# Patient Record
Sex: MALE | Race: WHITE
[De-identification: names, ages, dates, MRNs, and addresses within clinical notes are randomized per-mention and may not be internally consistent; named-entity substitution may affect disease eponyms.]

---

## 2019-06-14 ENCOUNTER — HOSPITAL ENCOUNTER (INPATIENT)
Dept: HOSPITAL 47 - EC | Age: 79
LOS: 4 days | Discharge: HOME | DRG: 280 | End: 2019-06-18
Payer: MEDICARE

## 2019-06-14 VITALS — BODY MASS INDEX: 34.4 KG/M2

## 2019-06-14 DIAGNOSIS — I25.10: ICD-10-CM

## 2019-06-14 DIAGNOSIS — I65.29: ICD-10-CM

## 2019-06-14 DIAGNOSIS — Z95.5: ICD-10-CM

## 2019-06-14 DIAGNOSIS — Z82.49: ICD-10-CM

## 2019-06-14 DIAGNOSIS — R79.1: ICD-10-CM

## 2019-06-14 DIAGNOSIS — E66.9: ICD-10-CM

## 2019-06-14 DIAGNOSIS — I08.1: ICD-10-CM

## 2019-06-14 DIAGNOSIS — Z66: ICD-10-CM

## 2019-06-14 DIAGNOSIS — Z92.21: ICD-10-CM

## 2019-06-14 DIAGNOSIS — Z82.3: ICD-10-CM

## 2019-06-14 DIAGNOSIS — Z85.51: ICD-10-CM

## 2019-06-14 DIAGNOSIS — Z87.891: ICD-10-CM

## 2019-06-14 DIAGNOSIS — E86.0: ICD-10-CM

## 2019-06-14 DIAGNOSIS — J44.0: ICD-10-CM

## 2019-06-14 DIAGNOSIS — I73.9: ICD-10-CM

## 2019-06-14 DIAGNOSIS — Z79.82: ICD-10-CM

## 2019-06-14 DIAGNOSIS — N17.9: ICD-10-CM

## 2019-06-14 DIAGNOSIS — Z79.02: ICD-10-CM

## 2019-06-14 DIAGNOSIS — Z79.899: ICD-10-CM

## 2019-06-14 DIAGNOSIS — J18.9: ICD-10-CM

## 2019-06-14 DIAGNOSIS — Z95.810: ICD-10-CM

## 2019-06-14 DIAGNOSIS — E78.5: ICD-10-CM

## 2019-06-14 DIAGNOSIS — I11.0: ICD-10-CM

## 2019-06-14 DIAGNOSIS — I50.23: ICD-10-CM

## 2019-06-14 DIAGNOSIS — Z95.0: ICD-10-CM

## 2019-06-14 DIAGNOSIS — Z95.1: ICD-10-CM

## 2019-06-14 DIAGNOSIS — I25.5: ICD-10-CM

## 2019-06-14 DIAGNOSIS — I21.4: Primary | ICD-10-CM

## 2019-06-14 LAB
ALBUMIN SERPL-MCNC: 4 G/DL (ref 3.5–5)
ALP SERPL-CCNC: 140 U/L (ref 38–126)
ALT SERPL-CCNC: 61 U/L (ref 21–72)
ANION GAP SERPL CALC-SCNC: 11 MMOL/L
APTT BLD: 25.7 SEC (ref 22–30)
AST SERPL-CCNC: 60 U/L (ref 17–59)
BASOPHILS # BLD AUTO: 0 K/UL (ref 0–0.2)
BASOPHILS NFR BLD AUTO: 1 %
BUN SERPL-SCNC: 40 MG/DL (ref 9–20)
CALCIUM SPEC-MCNC: 9.7 MG/DL (ref 8.4–10.2)
CHLORIDE SERPL-SCNC: 103 MMOL/L (ref 98–107)
CO2 SERPL-SCNC: 28 MMOL/L (ref 22–30)
D DIMER PPP FEU-MCNC: 4.84 MG/L FEU (ref ?–0.6)
EOSINOPHIL # BLD AUTO: 0.1 K/UL (ref 0–0.7)
EOSINOPHIL NFR BLD AUTO: 3 %
ERYTHROCYTE [DISTWIDTH] IN BLOOD BY AUTOMATED COUNT: 4.62 M/UL (ref 4.3–5.9)
ERYTHROCYTE [DISTWIDTH] IN BLOOD: 18 % (ref 11.5–15.5)
GLUCOSE SERPL-MCNC: 96 MG/DL (ref 74–99)
HCT VFR BLD AUTO: 48.3 % (ref 39–53)
HGB BLD-MCNC: 15.4 GM/DL (ref 13–17.5)
INR PPP: 1.7 (ref ?–1.2)
LYMPHOCYTES # SPEC AUTO: 0.9 K/UL (ref 1–4.8)
LYMPHOCYTES NFR SPEC AUTO: 18 %
MAGNESIUM SPEC-SCNC: 1.9 MG/DL (ref 1.6–2.3)
MCH RBC QN AUTO: 33.3 PG (ref 25–35)
MCHC RBC AUTO-ENTMCNC: 31.9 G/DL (ref 31–37)
MCV RBC AUTO: 104.5 FL (ref 80–100)
MONOCYTES # BLD AUTO: 0.4 K/UL (ref 0–1)
MONOCYTES NFR BLD AUTO: 7 %
NEUTROPHILS # BLD AUTO: 3.5 K/UL (ref 1.3–7.7)
NEUTROPHILS NFR BLD AUTO: 69 %
PLATELET # BLD AUTO: 112 K/UL (ref 150–450)
POTASSIUM SERPL-SCNC: 4 MMOL/L (ref 3.5–5.1)
PROT SERPL-MCNC: 7.2 G/DL (ref 6.3–8.2)
PT BLD: 16.6 SEC (ref 9–12)
SODIUM SERPL-SCNC: 142 MMOL/L (ref 137–145)
WBC # BLD AUTO: 5.1 K/UL (ref 3.8–10.6)

## 2019-06-14 PROCEDURE — 82272 OCCULT BLD FECES 1-3 TESTS: CPT

## 2019-06-14 PROCEDURE — 85025 COMPLETE CBC W/AUTO DIFF WBC: CPT

## 2019-06-14 PROCEDURE — 96365 THER/PROPH/DIAG IV INF INIT: CPT

## 2019-06-14 PROCEDURE — 96376 TX/PRO/DX INJ SAME DRUG ADON: CPT

## 2019-06-14 PROCEDURE — 71046 X-RAY EXAM CHEST 2 VIEWS: CPT

## 2019-06-14 PROCEDURE — 85379 FIBRIN DEGRADATION QUANT: CPT

## 2019-06-14 PROCEDURE — 93005 ELECTROCARDIOGRAM TRACING: CPT

## 2019-06-14 PROCEDURE — 80048 BASIC METABOLIC PNL TOTAL CA: CPT

## 2019-06-14 PROCEDURE — 36415 COLL VENOUS BLD VENIPUNCTURE: CPT

## 2019-06-14 PROCEDURE — 99291 CRITICAL CARE FIRST HOUR: CPT

## 2019-06-14 PROCEDURE — 78582 LUNG VENTILAT&PERFUS IMAGING: CPT

## 2019-06-14 PROCEDURE — 83880 ASSAY OF NATRIURETIC PEPTIDE: CPT

## 2019-06-14 PROCEDURE — 94640 AIRWAY INHALATION TREATMENT: CPT

## 2019-06-14 PROCEDURE — 85610 PROTHROMBIN TIME: CPT

## 2019-06-14 PROCEDURE — 85730 THROMBOPLASTIN TIME PARTIAL: CPT

## 2019-06-14 PROCEDURE — 80053 COMPREHEN METABOLIC PANEL: CPT

## 2019-06-14 PROCEDURE — 83735 ASSAY OF MAGNESIUM: CPT

## 2019-06-14 PROCEDURE — 84484 ASSAY OF TROPONIN QUANT: CPT

## 2019-06-14 PROCEDURE — 96366 THER/PROPH/DIAG IV INF ADDON: CPT

## 2019-06-14 PROCEDURE — 93306 TTE W/DOPPLER COMPLETE: CPT

## 2019-06-14 RX ADMIN — CARVEDILOL SCH MG: 3.12 TABLET, FILM COATED ORAL at 18:08

## 2019-06-14 RX ADMIN — FUROSEMIDE SCH: 10 INJECTION, SOLUTION INTRAMUSCULAR; INTRAVENOUS at 15:54

## 2019-06-14 RX ADMIN — CEFAZOLIN SCH MLS/HR: 330 INJECTION, POWDER, FOR SOLUTION INTRAMUSCULAR; INTRAVENOUS at 14:22

## 2019-06-14 RX ADMIN — ATORVASTATIN CALCIUM SCH MG: 40 TABLET, FILM COATED ORAL at 18:08

## 2019-06-14 RX ADMIN — HEPARIN SODIUM SCH MLS/HR: 10000 INJECTION, SOLUTION INTRAVENOUS at 14:33

## 2019-06-14 RX ADMIN — OXYCODONE AND ACETAMINOPHEN PRN EACH: 5; 325 TABLET ORAL at 18:08

## 2019-06-14 NOTE — XR
EXAMINATION TYPE: XR chest 2V

 

DATE OF EXAM: 6/14/2019

 

COMPARISON: NONE

 

HISTORY: Shortness of breath for 3 weeks

 

TECHNIQUE:  Frontal and lateral views of the chest are obtained.

 

FINDINGS:  There is a multilead left-sided cardiac device and enlarged cardiac mediastinal silhouette
. Post CABG changes are also noted. There is haziness surrounding the right heart border. No signific
ant pulmonary vascular congestion. No sizable pleural effusion or pneumothorax. Postsurgical change o
f the thoracolumbar spine is seen with mild compression deformity at the level above the postsurgical
 change and diffuse osseous demineralization as well as moderate degenerative changes of the spine.

 

IMPRESSION:  

1. Hazy right middle lobe opacity overlying the right cardiac border could relate to atelectasis or p
neumonia in the proper clinical setting.

2. Enlarged cardiomediastinal silhouette is seen with postoperative change.

3. Age indeterminant compression deformity of the thoracic spine a level above the surgical site. Cor
relate for point tenderness. No priors for comparison.

## 2019-06-14 NOTE — ED
SOB HPI





- General


Chief Complaint: Shortness of Breath


Stated Complaint: emma


Time Seen by Provider: 06/14/19 11:57


Source: patient, family, RN notes reviewed, old records reviewed


Mode of arrival: wheelchair


Limitations: no limitations





- History of Present Illness


Initial Comments: 





This is a 71-year-old male who presents with complaints of shortness of breath 

and going on progressively over last he did recently drive from [place] to 

[place] he does say he's been having worsening shortness of breath some 

orthopnea and exertional dyspnea.  No fevers chills nausea vomiting sweats no 

overt chest pain.  He has a history of heart failure.  He does have some 

peripheral edema which she states is not as bad as it has been in the past.  He 

has a cough some clear phlegm and per his wife he's been less active for last 

week or 2 then usual.


MD Complaint: shortness of breath





- Related Data


                                Home Medications











 Medication  Instructions  Recorded  Confirmed


 


Allopurinol [Zyloprim] 300 mg PO DAILY 06/14/19 06/14/19


 


Aspirin [[place] Aspirin EC] 81 mg PO DAILY 06/14/19 06/14/19


 


Atorvastatin [Lipitor] 40 mg PO DAILY 06/14/19 06/14/19


 


Bumetanide [BUMEX] 1 mg PO DAILY 06/14/19 06/14/19


 


Carvedilol [Coreg] 3.125 mg PO BID 06/14/19 06/14/19


 


Clopidogrel [Plavix] 75 mg PO DAILY 06/14/19 06/14/19


 


Lisinopril [Zestril] 10 mg PO DAILY 06/14/19 06/14/19


 


oxyCODONE HCL/ACETAMINOPHEN 1 tab PO Q4HR PRN 06/14/19 06/14/19





[Percocet 5-325 mg]   











                                    Allergies











Allergy/AdvReac Type Severity Reaction Status Date / Time


 


No Known Allergies Allergy   Verified 06/14/19 12:07














Review of Systems


ROS Statement: 


Those systems with pertinent positive or pertinent negative responses have been 

documented in the HPI.





ROS Other: All systems not noted in ROS Statement are negative.





Past Medical History


Past Medical History: Coronary Artery Disease (CAD), Cancer, Vascular Disorder


Additional Past Medical History / Comment(s): bladder cancer, carotid stenosis


History of Any Multi-Drug Resistant Organisms: None Reported


Past Surgical History: Appendectomy, Back Surgery, Coronary Bypass/CABG, Heart 

Catheterization, Heart Catheterization With Stent, Pacemaker


Past Psychological History: No Psychological Hx Reported


Smoking Status: Former smoker


Past Alcohol Use History: None Reported


Past Drug Use History: None Reported





General Exam





- General Exam Comments


Initial Comments: 





Is a well-developed well-nourished awake alert oriented 3


Limitations: no limitations


General appearance: alert, in no apparent distress


Head exam: Present: atraumatic, normocephalic, normal inspection


Eye exam: Present: normal appearance, PERRL, EOMI.  Absent: scleral icterus, 

conjunctival injection, periorbital swelling


ENT exam: Present: normal exam, mucous membranes moist


Neck exam: Present: normal inspection, full ROM, other (No stridor JVD or 

bruits).  Absent: tenderness, meningismus, lymphadenopathy


Respiratory exam: Present: normal lung sounds bilaterally.  Absent: respiratory 

distress, wheezes, rales, rhonchi, stridor


Cardiovascular Exam: Present: regular rate, normal rhythm, normal heart sounds. 

Absent: systolic murmur, diastolic murmur, rubs, gallop, clicks


GI/Abdominal exam: Present: soft, normal bowel sounds.  Absent: distended, 

tenderness, guarding, rebound, rigid


Rectal exam: Present: deferred


Extremities exam: Present: full ROM, normal capillary refill, pedal edema.  

Absent: tenderness, joint swelling, calf tenderness


Back exam: Present: normal inspection


Neurological exam: Present: alert, oriented X3, CN II-XII intact


Psychiatric exam: Present: normal affect, normal mood


Skin exam: Present: warm, dry, intact, normal color.  Absent: rash





Course


                                   Vital Signs











  06/14/19 06/14/19 06/14/19





  11:49 12:18 12:28


 


Temperature 97.7 F  


 


Pulse Rate 115 H 114 H 114 H


 


Respiratory 24  





Rate   


 


Blood Pressure 111/71  


 


O2 Sat by Pulse 100  





Oximetry   














  06/14/19





  13:17


 


Temperature 


 


Pulse Rate 114 H


 


Respiratory 18





Rate 


 


Blood Pressure 110/83


 


O2 Sat by Pulse 100





Oximetry 














- Reevaluation(s)


Reevaluation #1: 





06/14/19 13:47


Patient did get some relief and is breathing after the nebulizer treatment.





Medical Decision Making





- Medical Decision Making





I did discuss findings with the patient and family member.  Patient will be 

admitted he does demonstrate evidence of dehydration intravascularly though he 

does also have evidence of congestive heart failure with markedly elevated d-

dimer elevated troponin and renal insufficiency and elevated creatinine.  

Patient will be admitted VQ scan will be ordered.





- Lab Data


Result diagrams: 


                                 06/14/19 12:18





                                 06/14/19 12:18


                                   Lab Results











  06/14/19 06/14/19 06/14/19 Range/Units





  12:18 12:18 12:18 


 


WBC  5.1    (3.8-10.6)  k/uL


 


RBC  4.62    (4.30-5.90)  m/uL


 


Hgb  15.4    (13.0-17.5)  gm/dL


 


Hct  48.3    (39.0-53.0)  %


 


MCV  104.5 H    (80.0-100.0)  fL


 


MCH  33.3    (25.0-35.0)  pg


 


MCHC  31.9    (31.0-37.0)  g/dL


 


RDW  18.0 H    (11.5-15.5)  %


 


Plt Count  112 L    (150-450)  k/uL


 


Neutrophils %  69    %


 


Lymphocytes %  18    %


 


Monocytes %  7    %


 


Eosinophils %  3    %


 


Basophils %  1    %


 


Neutrophils #  3.5    (1.3-7.7)  k/uL


 


Lymphocytes #  0.9 L    (1.0-4.8)  k/uL


 


Monocytes #  0.4    (0-1.0)  k/uL


 


Eosinophils #  0.1    (0-0.7)  k/uL


 


Basophils #  0.0    (0-0.2)  k/uL


 


Hypochromasia  Slight    


 


Anisocytosis  Slight    


 


Macrocytosis  Moderate    


 


PT    16.6 H  (9.0-12.0)  sec


 


INR    1.7 H  (<1.2)  


 


APTT    25.7  (22.0-30.0)  sec


 


D-Dimer    4.84 H  (<0.60)  mg/L FEU


 


Sodium   142   (137-145)  mmol/L


 


Potassium   4.0   (3.5-5.1)  mmol/L


 


Chloride   103   ()  mmol/L


 


Carbon Dioxide   28   (22-30)  mmol/L


 


Anion Gap   11   mmol/L


 


BUN   40 H   (9-20)  mg/dL


 


Creatinine   1.63 H   (0.66-1.25)  mg/dL


 


Est GFR (CKD-EPI)AfAm   46   (>60 ml/min/1.73 sqM)  


 


Est GFR (CKD-EPI)NonAf   40   (>60 ml/min/1.73 sqM)  


 


Glucose   96   (74-99)  mg/dL


 


Calcium   9.7   (8.4-10.2)  mg/dL


 


Magnesium   1.9   (1.6-2.3)  mg/dL


 


Total Bilirubin   3.8 H   (0.2-1.3)  mg/dL


 


AST   60 H   (17-59)  U/L


 


ALT   61   (21-72)  U/L


 


Alkaline Phosphatase   140 H   ()  U/L


 


Troponin I     (0.000-0.034)  ng/mL


 


NT-Pro-B Natriuret Pep     pg/mL


 


Total Protein   7.2   (6.3-8.2)  g/dL


 


Albumin   4.0   (3.5-5.0)  g/dL














  06/14/19 06/14/19 Range/Units





  12:18 12:18 


 


WBC    (3.8-10.6)  k/uL


 


RBC    (4.30-5.90)  m/uL


 


Hgb    (13.0-17.5)  gm/dL


 


Hct    (39.0-53.0)  %


 


MCV    (80.0-100.0)  fL


 


MCH    (25.0-35.0)  pg


 


MCHC    (31.0-37.0)  g/dL


 


RDW    (11.5-15.5)  %


 


Plt Count    (150-450)  k/uL


 


Neutrophils %    %


 


Lymphocytes %    %


 


Monocytes %    %


 


Eosinophils %    %


 


Basophils %    %


 


Neutrophils #    (1.3-7.7)  k/uL


 


Lymphocytes #    (1.0-4.8)  k/uL


 


Monocytes #    (0-1.0)  k/uL


 


Eosinophils #    (0-0.7)  k/uL


 


Basophils #    (0-0.2)  k/uL


 


Hypochromasia    


 


Anisocytosis    


 


Macrocytosis    


 


PT    (9.0-12.0)  sec


 


INR    (<1.2)  


 


APTT    (22.0-30.0)  sec


 


D-Dimer    (<0.60)  mg/L FEU


 


Sodium    (137-145)  mmol/L


 


Potassium    (3.5-5.1)  mmol/L


 


Chloride    ()  mmol/L


 


Carbon Dioxide    (22-30)  mmol/L


 


Anion Gap    mmol/L


 


BUN    (9-20)  mg/dL


 


Creatinine    (0.66-1.25)  mg/dL


 


Est GFR (CKD-EPI)AfAm    (>60 ml/min/1.73 sqM)  


 


Est GFR (CKD-EPI)NonAf    (>60 ml/min/1.73 sqM)  


 


Glucose    (74-99)  mg/dL


 


Calcium    (8.4-10.2)  mg/dL


 


Magnesium    (1.6-2.3)  mg/dL


 


Total Bilirubin    (0.2-1.3)  mg/dL


 


AST    (17-59)  U/L


 


ALT    (21-72)  U/L


 


Alkaline Phosphatase    ()  U/L


 


Troponin I   0.163 H*  (0.000-0.034)  ng/mL


 


NT-Pro-B Natriuret Pep  3410   pg/mL


 


Total Protein    (6.3-8.2)  g/dL


 


Albumin    (3.5-5.0)  g/dL














- EKG Data


-: EKG Interpreted by Me (Atrial sensed ventricular paced rhythm with PVC rate 

was 1:151:30 QRS d)





- Radiology Data


Radiology results: report reviewed (I did review the imaging and report or is 

some evidence of increased pulmonary markings basically report), image reviewed





Critical Care Time


Critical Care Time: Yes


Critical Care Time: 





31 minutes of critical care time which includes initial presentation with 

history physical labs x-rays multiple reevaluation the patient discussed with 

patient family regarding findings discussed with Dr. honeycutt regarding the 

findings admission orders and documentation of the above





Disposition


Clinical Impression: 


 Systolic congestive heart failure, Acute kidney injury, Elevated d-dimer, 

Elevated troponin, Acute bronchospasm





Disposition: ADMITTED AS IP TO THIS [facility]


Condition: Fair


Referrals: 


Nonstaff,Physician [Primary Care Provider] - 1-2 days

## 2019-06-14 NOTE — NM
EXAMINATION TYPE: NM pul vent and perfuse

 

DATE OF EXAM: 6/14/2019

 

COMPARISON: Chest x-ray same date

 

HISTORY: Shortness of breath, elevated d-dimer

 

TECHNIQUE:  Utilizing inhalation of 65.7 mCi Tc 99m DTPA aerosol and intravenous injection of 5.12 mC
i of Tc 99m MAA, ventilation and perfusion images are acquired post injection in multiple projections
. 

 

FINDINGS:  There is no evidence of mismatched defects.  Central clumping of the radiopharmaceutical o
n ventilation imaging compatible with underlying COPD. Overall uptake is better on perfusion imaging 
than on ventilation imaging.

 

IMPRESSION: 

Low probability for pulmonary embolism.

## 2019-06-15 LAB
BASOPHILS # BLD AUTO: 0.1 K/UL (ref 0–0.2)
BASOPHILS NFR BLD AUTO: 1 %
EOSINOPHIL # BLD AUTO: 0.2 K/UL (ref 0–0.7)
EOSINOPHIL NFR BLD AUTO: 4 %
ERYTHROCYTE [DISTWIDTH] IN BLOOD BY AUTOMATED COUNT: 4.28 M/UL (ref 4.3–5.9)
ERYTHROCYTE [DISTWIDTH] IN BLOOD: 17.8 % (ref 11.5–15.5)
HCT VFR BLD AUTO: 44.7 % (ref 39–53)
HGB BLD-MCNC: 14.1 GM/DL (ref 13–17.5)
LYMPHOCYTES # SPEC AUTO: 1 K/UL (ref 1–4.8)
LYMPHOCYTES NFR SPEC AUTO: 18 %
MCH RBC QN AUTO: 33 PG (ref 25–35)
MCHC RBC AUTO-ENTMCNC: 31.6 G/DL (ref 31–37)
MCV RBC AUTO: 104.4 FL (ref 80–100)
MONOCYTES # BLD AUTO: 0.4 K/UL (ref 0–1)
MONOCYTES NFR BLD AUTO: 6 %
NEUTROPHILS # BLD AUTO: 3.9 K/UL (ref 1.3–7.7)
NEUTROPHILS NFR BLD AUTO: 68 %
PLATELET # BLD AUTO: 122 K/UL (ref 150–450)
WBC # BLD AUTO: 5.7 K/UL (ref 3.8–10.6)

## 2019-06-15 RX ADMIN — BUMETANIDE SCH MG: 1 TABLET ORAL at 18:41

## 2019-06-15 RX ADMIN — ATORVASTATIN CALCIUM SCH MG: 40 TABLET, FILM COATED ORAL at 09:08

## 2019-06-15 RX ADMIN — ALLOPURINOL SCH MG: 300 TABLET ORAL at 09:08

## 2019-06-15 RX ADMIN — BUMETANIDE SCH: 1 TABLET ORAL at 16:53

## 2019-06-15 RX ADMIN — CARVEDILOL SCH MG: 3.12 TABLET, FILM COATED ORAL at 17:21

## 2019-06-15 RX ADMIN — CLOPIDOGREL BISULFATE SCH MG: 75 TABLET ORAL at 09:08

## 2019-06-15 RX ADMIN — FUROSEMIDE SCH MG: 10 INJECTION, SOLUTION INTRAMUSCULAR; INTRAVENOUS at 02:17

## 2019-06-15 RX ADMIN — CEFAZOLIN SCH MLS/HR: 330 INJECTION, POWDER, FOR SOLUTION INTRAMUSCULAR; INTRAVENOUS at 13:58

## 2019-06-15 RX ADMIN — ASPIRIN 81 MG CHEWABLE TABLET SCH MG: 81 TABLET CHEWABLE at 09:08

## 2019-06-15 RX ADMIN — CARVEDILOL SCH MG: 3.12 TABLET, FILM COATED ORAL at 06:49

## 2019-06-15 RX ADMIN — ATORVASTATIN CALCIUM SCH MG: 40 TABLET, FILM COATED ORAL at 21:24

## 2019-06-15 RX ADMIN — HEPARIN SODIUM SCH: 10000 INJECTION, SOLUTION INTRAVENOUS at 03:27

## 2019-06-15 RX ADMIN — AZITHROMYCIN SCH MG: 250 TABLET, FILM COATED ORAL at 13:58

## 2019-06-15 NOTE — P.CNPUL
History of Present Illness


Consult date: 06/15/19


Reason for consult: dyspnea


History of present illness: 





71-year-old male patient with known history of coronary artery disease, previous

bypass surgery, possible cardiomyopathy as the patient has a AICD with 

biventricular pacer and all of this work has been done in [place].  The patient 

also has history of bladder cancer receiving intravesicular chemotherapy 

locally.  No history of any chronic lung disease or disorder.  The patient 

arrived to [place] approximately 5 weeks ago.  This is his summer vacation.  

During the study was having increased cough and congestion and some increased 

shortness of breath.  The patient came into the hospital.  He was found to be in

acute kidney injury with a creatinine of 1.6.  His troponins were minimally 

elevated and there was evidence of troponin leak consistent with an acute non-

STEMI.  The patient is a chest x-ray that showed some limited right middle lobe 

pulmonary infiltrates.  The pulmonary consultation was requested.  The patient 

is also is being seen by cardiology.  He is on room air for now.  No nausea.  No

vomiting.  No aspiration.  He does not utilize any form of oxygen or pulmonary 

medication on outpatient basis.  Baseline creatinine is not known.  Otherwise no

other records available.  He is known to have peripheral vascular disease with 

previous intervention to his lower extremities.  He is also has undergone 

coronary stenting and previous bypass surgery.





Review of Systems








Constitutional: Denies chills, Denies fever


Eyes: denies blurred vision


Ears, nose, mouth and throat: Denies epistaxis, Denies hoarseness


Cardiovascular: Reports dyspnea on exertion, Reports edema


Respiratory: Reports cough with sputum


Gastrointestinal: Denies abdominal pain, Denies nausea, Denies vomiting


Genitourinary: Denies dysuria, Denies hematuria


Integumentary: Denies color changes, Denies rash


Neurological: Denies confusion, Denies double vision, Denies gait dysfunction


Psychiatric: Denies anxiety, Denies confusion


Endocrine: Denies cold intolerance, Denies heat intolerance


Hematologic/Lymphatic: Denies easy bruising, Denies lymphadenopathy


Allergic/Immunologic: Denies anaphylaxis, Denies seasonal allergies








Past Medical History


Past Medical History: Coronary Artery Disease (CAD), Cancer, Vascular Disorder


Additional Past Medical History / Comment(s): bladder cancer treated with 

intravesicular chemotherapy, peripheral vascular disease, carotid artery 

stenosis, coronary artery disease with previous bypass surgery, hyperuricemia, 

hyperlipidemia, previous history of acid the/ventricular pacer insertion, 

suspected cardiomyopathy.


History of Any Multi-Drug Resistant Organisms: None Reported


Past Surgical History: Appendectomy, Back Surgery, Coronary Bypass/CABG, Heart 

Catheterization, Heart Catheterization With Stent, Pacemaker


Past Anesthesia/Blood Transfusion Reactions: No Reported Reaction


Date of Last Stent Placement:: 2016


Type of Cardiac Device: AICD


Device Placement Date:: 2016


Past Psychological History: No Psychological Hx Reported


Smoking Status: Former smoker


Past Alcohol Use History: None Reported


Past Drug Use History: None Reported





- Past Family History


  ** Father


Family Medical History: CVA/TIA





  ** Mother


Family Medical History: Myocardial Infarction (MI)





Medications and Allergies


                                Home Medications











 Medication  Instructions  Recorded  Confirmed  Type


 


Allopurinol [Zyloprim] 300 mg PO DAILY 06/14/19 06/14/19 History


 


Aspirin [[place] Aspirin EC] 81 mg PO DAILY 06/14/19 06/14/19 History


 


Atorvastatin [Lipitor] 40 mg PO DAILY 06/14/19 06/14/19 History


 


Bumetanide [BUMEX] 1 mg PO DAILY 06/14/19 06/14/19 History


 


Carvedilol [Coreg] 3.125 mg PO BID 06/14/19 06/14/19 History


 


Clopidogrel [Plavix] 75 mg PO DAILY 06/14/19 06/14/19 History


 


Lisinopril [Zestril] 10 mg PO DAILY 06/14/19 06/14/19 History


 


oxyCODONE HCL/ACETAMINOPHEN 1 tab PO Q4HR PRN 06/14/19 06/14/19 History





[Percocet 5-325 mg]    








                                    Allergies











Allergy/AdvReac Type Severity Reaction Status Date / Time


 


No Known Allergies Allergy   Verified 06/14/19 12:07














Physical Exam


Vitals: 


                                   Vital Signs











  Temp Pulse Pulse Resp BP BP BP


 


 06/15/19 11:34  97.5 F L   109 H  20   


 


 06/15/19 10:34    111 H     107/62


 


 06/15/19 08:00  97.3 F L   110 H  16   


 


 06/15/19 04:00  97.3 F L   110 H  18   86/62 


 


 06/15/19 00:00  97.4 F L   110 H  18   91/56 


 


 06/14/19 20:00  97.6 F   112 H  16   89/62 


 


 06/14/19 17:26  97.5 F L   78  22   101/71 


 


 06/14/19 16:57      90/67  


 


 06/14/19 16:33   112 H   20  90/66  


 


 06/14/19 15:43   111 H   20  90/55  


 


 06/14/19 15:32   99   20  103/64  


 


 06/14/19 14:40   112 H   18  101/68  


 


 06/14/19 14:14   112 H   18  96/69  














  BP Pulse Ox


 


 06/15/19 11:34  82/58  94 L


 


 06/15/19 10:34  


 


 06/15/19 08:00  82/58  94 L


 


 06/15/19 04:00   94 L


 


 06/15/19 00:00   95


 


 06/14/19 20:00   94 L


 


 06/14/19 17:26  


 


 06/14/19 16:57  


 


 06/14/19 16:33   98


 


 06/14/19 15:43   92 L


 


 06/14/19 15:32   95


 


 06/14/19 14:40   99


 


 06/14/19 14:14   100








                                Intake and Output











 06/14/19 06/15/19 06/15/19





 22:59 06:59 14:59


 


Intake Total 246.445 0 420


 


Output Total 150 125 


 


Balance 96.445 -125 420


 


Intake:   


 


  Intake, IV Titration 126.445 0 





  Amount   


 


    Heparin Sod,Pork in 0.45% 126.445 0 





    NaCl 25,000 unit In 0.45   





    % NaCl 1 250ml.bag @ 18   





    UNITS/KG/HR 18.779 mls/hr   





    IV .[id number] [facility] Rx#:   





    [id number]   


 


  Oral 120  420


 


Output:   


 


  Urine 150 125 


 


Other:   


 


  Voiding Method Urinal Urinal Urinal


 


  # Voids 1  


 


  # Bowel Movements   1


 


  Weight 109.9 kg 109 kg 














Results








Gen. appearance, comfortable likely distress


Head exam was generally normal. There was no scleral icterus or corneal arcus. 

Mucous membranes were moist.


Neck was supple and without jugular venous distension, thyromegaly, or carotid 

bruits. Carotids were easily palpable bilaterally. There was no adenopathy.


Lungs sounds are clear diminished in lung bases otherwise there is no crackles 

or rhonchi or wheezes.


Cardiac exam revealed the PMI to be normally situated and sized. The rhythm was 

regular and no extrasystoles were noted during several minutes of auscultation. 

The first and second heart sounds were normal and physiologic splitting of the s

econd heart sound was noted. There were no murmurs, rubs, clicks, or gallops.  

The patient is a pacemaker pocket in the left anterior chest area in addition to

sternotomy scar which is dry clean and intact.


Abdominal exam revealed normal bowel sounds. The abdomen was soft, non-tender, 

and without masses, organomegaly, or appreciable enlargement of the abdominal 

aorta.


Examination of the extremities revealed easily palpable yet diminished radial, 

femoral and pedal pulses. There was no cyanosis, clubbing or edema.


Examination of the skin revealed no evidence of significant rashes, suspicious 

appearing nevi or other concerning lesions.





- Laboratory Findings


CBC and BMP: 


                                 06/15/19 06:21





                                 06/14/19 12:18


PT/INR, D-dimer











PT  16.6 sec (9.0-12.0)  H  06/14/19  12:18    


 


INR  1.7  (<1.2)  H  06/14/19  12:18    


 


D-Dimer  4.84 mg/L FEU (<0.60)  H  06/14/19  12:18    








Abnormal lab findings: 


                                  Abnormal Labs











  06/14/19 06/14/19 06/14/19





  12:18 12:18 12:18


 


RBC   


 


MCV  104.5 H  


 


RDW  18.0 H  


 


Plt Count  112 L  


 


Lymphocytes #  0.9 L  


 


PT    16.6 H


 


INR    1.7 H


 


APTT   


 


D-Dimer    4.84 H


 


BUN   40 H 


 


Creatinine   1.63 H 


 


Total Bilirubin   3.8 H 


 


AST   60 H 


 


Alkaline Phosphatase   140 H 


 


Troponin I   














  06/14/19 06/14/19 06/14/19





  12:18 19:52 19:52


 


RBC   


 


MCV   


 


RDW   


 


Plt Count   


 


Lymphocytes #   


 


PT   


 


INR   


 


APTT   >200.0 H* 


 


D-Dimer   


 


BUN   


 


Creatinine   


 


Total Bilirubin   


 


AST   


 


Alkaline Phosphatase   


 


Troponin I  0.163 H*   0.136 H*














  06/14/19 06/15/19 06/15/19





  22:13 02:24 02:24


 


RBC   


 


MCV   


 


RDW   


 


Plt Count   


 


Lymphocytes #   


 


PT   


 


INR   


 


APTT  >200.0 H*   57.3 H


 


D-Dimer   


 


BUN   


 


Creatinine   


 


Total Bilirubin   


 


AST   


 


Alkaline Phosphatase   


 


Troponin I   0.136 H* 














  06/15/19 06/15/19





  06:21 06:21


 


RBC  4.28 L 


 


MCV  104.4 H 


 


RDW  17.8 H 


 


Plt Count  122 L 


 


Lymphocytes #  


 


PT  


 


INR  


 


APTT   67.5 H


 


D-Dimer  


 


BUN  


 


Creatinine  


 


Total Bilirubin  


 


AST  


 


Alkaline Phosphatase  


 


Troponin I  














Assessment and Plan


Plan: 








1 shortness of breath, subacute, suspecting a right middle lobe pneumonia





2 coronary artery disease with a previous coronary artery bypass surgery.  

Suspect a possible STEMI as the patient had elevation in  troponins





3 CHF, suspected





4 kidney injury, likely acute





5 elevated d-dimer with a low probability VQ scan, unlikely to be related to 

pulmonary embolism





6 hypertension





7 hyperlipidemia





8 peripheral vascular disease





9 history of AICD/pacer insertion





Plan





Agree on Rocephin and Zithromax.  Monitor pulmonary status.  The patient is 

currently on room air oxygen.  No signs of any respiratory distress.  Monitor 

renal function.  Echocardiogram.  We'll follow.

## 2019-06-15 NOTE — P.CRDCN
History of Present Illness


Consult date: 06/15/19


History of present illness: 


This is a 71-year-old gentleman with history of coronary artery disease with 

previous bypass surgery, possible cardiomyopathy and congestive heart failure, 

status post ICD placement with biventricular pacemaker.  Apparently the 

pacemaker was upgraded to by IV.  In December 2018.  Patient usually comes to 

[place] on in summer months and spends several weeks.  He has been in [place] area for about 5 weeks.  Was last 5 weeks.  Patient has been coughing and 

bringing up clear phlegm and has been having shortness of breath.  Denied any c

hest pain.  Doesn't appear patient has any proximal nocturnal dyspnea.  Denies 

any pedal swelling.  On admission his d-dimer was elevated and patient had a VQ 

scan which was low probability for pulmonary emboli.  She is being treated with 

IV Lasix.  A chest x-ray shows cardiomegaly but no overt congestive heart 

failure, but there is suspicion for possible pneumonia in the right middle lobe.

 Patient is seen by pulmonologist and is planning to continue treated with 

antibiotics.  His creatinine is up to 1.6.  His troponins are elevated but the 

pattern is not consistent with acute myocardial infarction.  I'm going to 

discontinue IV Lasix and start him on by mouth Bumex.  I'm going to get an ech

ocardiogram done.  Further recommendation depending upon the clinical course








Review of Systems





As per the chart





Past Medical History


Past Medical History: Coronary Artery Disease (CAD), Cancer, Vascular Disorder


Additional Past Medical History / Comment(s): bladder cancer, carotid stenosis


History of Any Multi-Drug Resistant Organisms: None Reported


Past Surgical History: Appendectomy, Back Surgery, Coronary Bypass/CABG, Heart 

Catheterization, Heart Catheterization With Stent, Pacemaker


Past Anesthesia/Blood Transfusion Reactions: No Reported Reaction


Date of Last Stent Placement:: 2016


Type of Cardiac Device: AICD


Device Placement Date:: 2016


Past Psychological History: No Psychological Hx Reported


Smoking Status: Former smoker


Past Alcohol Use History: None Reported


Past Drug Use History: None Reported





- Past Family History


  ** Father


Family Medical History: CVA/TIA





  ** Mother


Family Medical History: Myocardial Infarction (MI)





Medications and Allergies


                                Home Medications











 Medication  Instructions  Recorded  Confirmed  Type


 


Allopurinol [Zyloprim] 300 mg PO DAILY 06/14/19 06/14/19 History


 


Aspirin [[place] Aspirin EC] 81 mg PO DAILY 06/14/19 06/14/19 History


 


Atorvastatin [Lipitor] 40 mg PO DAILY 06/14/19 06/14/19 History


 


Bumetanide [BUMEX] 1 mg PO DAILY 06/14/19 06/14/19 History


 


Carvedilol [Coreg] 3.125 mg PO BID 06/14/19 06/14/19 History


 


Clopidogrel [Plavix] 75 mg PO DAILY 06/14/19 06/14/19 History


 


Lisinopril [Zestril] 10 mg PO DAILY 06/14/19 06/14/19 History


 


oxyCODONE HCL/ACETAMINOPHEN 1 tab PO Q4HR PRN 06/14/19 06/14/19 History





[Percocet 5-325 mg]    








                                    Allergies











Allergy/AdvReac Type Severity Reaction Status Date / Time


 


No Known Allergies Allergy   Verified 06/14/19 12:07














Physical Exam


Vitals: 


                                   Vital Signs











  Temp Pulse Pulse Resp BP BP BP


 


 06/15/19 11:34  97.5 F L   109 H  20   


 


 06/15/19 10:34    111 H     107/62


 


 06/15/19 08:00  97.3 F L   110 H  16   


 


 06/15/19 04:00  97.3 F L   110 H  18   86/62 


 


 06/15/19 00:00  97.4 F L   110 H  18   91/56 


 


 06/14/19 20:00  97.6 F   112 H  16   89/62 


 


 06/14/19 17:26  97.5 F L   78  22   101/71 


 


 06/14/19 16:57      90/67  


 


 06/14/19 16:33   112 H   20  90/66  


 


 06/14/19 15:43   111 H   20  90/55  


 


 06/14/19 15:32   99   20  103/64  


 


 06/14/19 14:40   112 H   18  101/68  


 


 06/14/19 14:14   112 H   18  96/69  


 


 06/14/19 13:17   114 H   18  110/83  


 


 06/14/19 12:28   114 H     


 


 06/14/19 12:18   114 H     














  BP Pulse Ox


 


 06/15/19 11:34  82/58  94 L


 


 06/15/19 10:34  


 


 06/15/19 08:00  82/58  94 L


 


 06/15/19 04:00   94 L


 


 06/15/19 00:00   95


 


 06/14/19 20:00   94 L


 


 06/14/19 17:26  


 


 06/14/19 16:57  


 


 06/14/19 16:33   98


 


 06/14/19 15:43   92 L


 


 06/14/19 15:32   95


 


 06/14/19 14:40   99


 


 06/14/19 14:14   100


 


 06/14/19 13:17   100


 


 06/14/19 12:28  


 


 06/14/19 12:18  








                                Intake and Output











 06/14/19 06/15/19 06/15/19





 22:59 06:59 14:59


 


Intake Total 246.445 0 420


 


Output Total 150 125 


 


Balance 96.445 -125 420


 


Intake:   


 


  Intake, IV Titration 126.445 0 





  Amount   


 


    Heparin Sod,Pork in 0.45% 126.445 0 





    NaCl 25,000 unit In 0.45   





    % NaCl 1 250ml.bag @ 18   





    UNITS/KG/HR 18.779 mls/hr   





    IV .[id number] [facility] Rx#:   





    [id number]   


 


  Oral 120  420


 


Output:   


 


  Urine 150 125 


 


Other:   


 


  Voiding Method Urinal Urinal Urinal


 


  # Voids 1  


 


  # Bowel Movements   1


 


  Weight 109.9 kg 109 kg 














GENERAL EXAM: Patient is alert and oriented and doesn't appear to be in any 

acute distress, seemed to chronically ill


HEENT: Normocephalic. Normal reaction of pupils, equal size, normal range of 

extraocular motion. No erythema or exudates in the throat.


NECK: No masses, no nuchal rigidity.


CHEST: No chest wall deformity.


LUNGS: Equal air entry with no crackles or wheeze.


HEART: S1 and S2 normal with no audible mumurs or gallops. Regular rhythm, 

femorals equal on both sides..


ABDOMEN: No hepatosplenomegaly, normal bowel sounds, no guarding or rigidity.


SKIN: No rashes


CENTRAL NERVOUS SYSTEM: No focal deficits.


EXTREMITIES: No cyanosis, clubbing or edema.





Results





                                 06/15/19 06:21





                                 06/14/19 12:18


                                 Cardiac Enzymes











  06/14/19 06/14/19 06/14/19 Range/Units





  12:18 12:18 19:52 


 


AST  60 H    (17-59)  U/L


 


Troponin I   0.163 H*  0.136 H*  (0.000-0.034)  ng/mL














  06/15/19 Range/Units





  02:24 


 


AST   (17-59)  U/L


 


Troponin I  0.136 H*  (0.000-0.034)  ng/mL








                                   Coagulation











  06/14/19 06/14/19 06/14/19 Range/Units





  12:18 19:52 22:13 


 


PT  16.6 H    (9.0-12.0)  sec


 


APTT  25.7  >200.0 H*  >200.0 H*  (22.0-30.0)  sec














  06/15/19 06/15/19 Range/Units





  02:24 06:21 


 


PT    (9.0-12.0)  sec


 


APTT  57.3 H  67.5 H  (22.0-30.0)  sec








                                       CBC











  06/14/19 06/15/19 Range/Units





  12:18 06:21 


 


WBC  5.1  5.7  (3.8-10.6)  k/uL


 


RBC  4.62  4.28 L  (4.30-5.90)  m/uL


 


Hgb  15.4  14.1  (13.0-17.5)  gm/dL


 


Hct  48.3  44.7  (39.0-53.0)  %


 


Plt Count  112 L  122 L  (150-450)  k/uL








                          Comprehensive Metabolic Panel











  06/14/19 Range/Units





  12:18 


 


Sodium  142  (137-145)  mmol/L


 


Potassium  4.0  (3.5-5.1)  mmol/L


 


Chloride  103  ()  mmol/L


 


Carbon Dioxide  28  (22-30)  mmol/L


 


BUN  40 H  (9-20)  mg/dL


 


Creatinine  1.63 H  (0.66-1.25)  mg/dL


 


Glucose  96  (74-99)  mg/dL


 


Calcium  9.7  (8.4-10.2)  mg/dL


 


AST  60 H  (17-59)  U/L


 


ALT  61  (21-72)  U/L


 


Alkaline Phosphatase  140 H  ()  U/L


 


Total Protein  7.2  (6.3-8.2)  g/dL


 


Albumin  4.0  (3.5-5.0)  g/dL








                               Current Medications











Generic Name Dose Route Start Last Admin





  Trade Name Freq  PRN Reason Stop Dose Admin


 


Allopurinol  300 mg  06/15/19 09:00  06/15/19 09:08





  Zyloprim  PO   300 mg





  DAILY ISABELLE   Administration


 


Aspirin  81 mg  06/15/19 09:00  06/15/19 09:08





  Aspirin  PO   81 mg





  DAILY ISABELLE   Administration


 


Atorvastatin Calcium  40 mg  06/15/19 21:00 





  Lipitor  PO  





  HS ISABELLE  


 


Azithromycin  250 mg  06/15/19 11:15 





  Zithromax  PO  





  DAILY ISABELLE  


 


Bumetanide  1 mg  06/15/19 16:00 





  Bumex  PO  





  BID@0900,1600 ISABELLE  


 


Carvedilol  3.125 mg  06/14/19 17:30  06/15/19 06:49





  Coreg  PO   3.125 mg





  BID-W/MEALS ISABELLE   Administration


 


Clopidogrel Bisulfate  75 mg  06/15/19 09:00  06/15/19 09:08





  Plavix  PO   75 mg





  DAILY ISABELLE   Administration


 


Sodium Chloride  1,000 mls @ 20 mls/hr  06/14/19 14:00  06/14/19 14:22





  Saline 0.9%  IV   20 mls/hr





  .Q24H ISABELLE   Administration


 


Ceftriaxone Sodium 1 gm/  50 mls @ 100 mls/hr  06/15/19 12:00 





  Sodium Chloride  IVPB  





  Q24H ISABELLE  


 


Oxycodone/Acetaminophen  1 each  06/14/19 13:57  06/14/19 18:08





  Percocet 5-325  PO   1 each





  Q4HR PRN   Administration





  Pain  








                                Intake and Output











 06/14/19 06/15/19 06/15/19





 22:59 06:59 14:59


 


Intake Total 246.445 0 420


 


Output Total 150 125 


 


Balance 96.445 -125 420


 


Intake:   


 


  Intake, IV Titration 126.445 0 





  Amount   


 


    Heparin Sod,Pork in 0.45% 126.445 0 





    NaCl 25,000 unit In 0.45   





    % NaCl 1 250ml.bag @ 18   





    UNITS/KG/HR 18.779 mls/hr   





    IV .[id number] [facility] Rx#:   





    [id number]   


 


  Oral 120  420


 


Output:   


 


  Urine 150 125 


 


Other:   


 


  Voiding Method Urinal Urinal Urinal


 


  # Voids 1  


 


  # Bowel Movements   1


 


  Weight 109.9 kg 109 kg 








                                        





                                 06/15/19 06:21 





                                 06/14/19 12:18 











EKG Interpretations (text)





Showed atrial sensed and ventricular paced rhythm.  Occasional APCs





Assessment and Plan


(1) Chronic systolic heart failure


Current Visit: Yes   Status: Acute   Code(s): I50.22 - CHRONIC SYSTOLIC 

(CONGESTIVE) HEART FAILURE   SNOMED Code(s): 375453667


   





(2) Elevated troponin


Current Visit: Yes   Status: Acute   Code(s): R74.8 - ABNORMAL LEVELS OF OTHER 

SERUM ENZYMES   SNOMED Code(s): 327222598


   





(3) Pneumonia


Current Visit: Yes   Status: Acute   Code(s): J18.9 - PNEUMONIA, UNSPECIFIED 

ORGANISM   SNOMED Code(s): 749487907


   





(4) History of coronary artery bypass graft


Current Visit: Yes   Status: Acute   Code(s): Z95.1 - PRESENCE OF AORTOCORONARY 

BYPASS GRAFT   SNOMED Code(s): 154510884


   





(5) Ischemic cardiomyopathy


Current Visit: Yes   Status: Acute   Code(s): I25.5 - ISCHEMIC CARDIOMYOPATHY   

SNOMED Code(s): 650675816


   





(6) Cardiac defibrillator in place


Current Visit: Yes   Status: Acute   Code(s): Z95.810 - PRESENCE OF AUTOMATIC 

(IMPLANTABLE) CARDIAC DEFIBRILLATOR   SNOMED Code(s): 968138944


   


Plan: 


We will continue current management with antibiotics.  I will discontinue IV 

Lasix as this doesn't seem to be significant fluid overload.  We'll follow his 

renal functions.  We'll repeat the echocardiogram.  We'll follow the 

recommendation pulmonologist.  Further recommendations depend upon the clinical 

course.

## 2019-06-15 NOTE — P.PN
Subjective


Progress Note Date: 06/15/19


Principal diagnosis: 


right middle lobe pneumonia


Non-ST elevation MI


Acute exacerbation CHF





71-year-old male patient with known history of coronary artery disease, previous

bypass surgery, possible cardiomyopathy as the patient has a AICD with 

biventricular pacer and all of this work has been done in [place].  The patient 

also has history of bladder cancer receiving intravesicular chemotherapy 

locally.  No history of any chronic lung disease or disorder.  The patient 

arrived to [place] approximately 5 weeks ago.  This is his summer vacation.  

During the study was having increased cough and congestion and some increased 

shortness of breath.  The patient came into the hospital.  He was found to be in

acute kidney injury with a creatinine of 1.6.  His troponins were minimally 

elevated and there was evidence of troponin leak consistent with an acute non-

STEMI.  The patient is a chest x-ray that showed some limited right middle lobe 

pulmonary infiltrates.





06/15/2019


Patient is seen and evaluated in room at bedside; remains somewhat lethargic but

does report respond to verbal stimulation


vital signs show temperature of 97.6, pulse 114, respiration 20 and blood 

pressure of 84/64; SpO2 of 96% on room air


Lab review shows a normal white blood count; troponin I was 0.136


Patient is started on IV ceftriaxone and azithromycin; pulmonary service agrees 

with above


Cardiology is following and recommending to continue with.  IV antibiotics since

pneumonia seems to be etiology of shortness of breath; IV Lasix has been 

discontinued; echocardiogram is ordered and cardiology will make further 

recommendations after echo is reported





Objective





- Vital Signs


Vital signs: 


                                   Vital Signs











Temp  97.3 F L  06/15/19 08:00


 


Pulse  111 H  06/15/19 10:34


 


Resp  16   06/15/19 08:00


 


BP  107/62   06/15/19 10:34


 


Pulse Ox  94 L  06/15/19 08:00








                                 Intake & Output











 06/14/19 06/15/19 06/15/19





 18:59 06:59 18:59


 


Intake Total 120 126.445 420


 


Output Total  275 


 


Balance 120 -148.555 420


 


Weight 109.9 kg 109 kg 


 


Intake:   


 


  Intake, IV Titration  126.445 





  Amount   


 


    Heparin Sod,Pork in 0.45%  126.445 





    NaCl 25,000 unit In 0.45   





    % NaCl 1 250ml.bag @ 18   





    UNITS/KG/HR 18.779 mls/hr   





    IV .[id number] [facility] Rx#:   





    [id number]   


 


  Oral 120  420


 


Output:   


 


  Urine  275 


 


Other:   


 


  Voiding Method Toilet Urinal Urinal


 


  # Voids  1 


 


  # Bowel Movements   1














- Exam


- Constitutional


General appearance: Present: average body habitus, cooperative, no acute 

distress


- EENT


Eyes: Present: anicteric sclerae, EOMI, PERRLA, normal appearance


ENT: Present: hearing grossly normal, normal oropharynx


Ears: bilateral: normal


- Neck


Neck: Present: normal ROM.  Absent: lymphadenopathy, rigidity, thyromegaly


Carotids: negative: bruit present


Thyroid: bilateral: normal size, negative: enlarged, nodule


- Respiratory


Respiratory: bilateral: CTA, negative: rales, rhonchi, wheezing


- Cardiovascular


Rhythm: regular


Heart sounds: normal: S1, S2


Abnormal Heart Sounds: Absent: systolic murmur, diastolic murmur


- Gastrointestinal


General gastrointestinal: Present: normal bowel sounds, soft.  Absent: 

distended, organomegaly, tenderness


- Genitourinary


Genitourinary Comment(s): deferred


- Integumentary


Integumentary: Present: normal turgor.  Absent: jaundiced, rash, ulcer


- Neurologic


Neurologic: Present: CNII-XII intact.  Absent: focal deficits


- Musculoskeletal


Musculoskeletal: Present: gait normal, strength equal bilaterally


- Psychiatric


Psychiatric: Present: A&O x's 3, appropriate affect, intact judgment & insight











- Labs


CBC & Chem 7: 


                                 06/15/19 06:21





                                 06/14/19 12:18


Labs: 


                  Abnormal Lab Results - Last 24 Hours (Table)











  06/14/19 06/14/19 06/14/19 Range/Units





  12:18 12:18 12:18 


 


RBC     (4.30-5.90)  m/uL


 


MCV  104.5 H    (80.0-100.0)  fL


 


RDW  18.0 H    (11.5-15.5)  %


 


Plt Count  112 L    (150-450)  k/uL


 


Lymphocytes #  0.9 L    (1.0-4.8)  k/uL


 


PT    16.6 H  (9.0-12.0)  sec


 


INR    1.7 H  (<1.2)  


 


APTT     (22.0-30.0)  sec


 


D-Dimer    4.84 H  (<0.60)  mg/L FEU


 


BUN   40 H   (9-20)  mg/dL


 


Creatinine   1.63 H   (0.66-1.25)  mg/dL


 


Total Bilirubin   3.8 H   (0.2-1.3)  mg/dL


 


AST   60 H   (17-59)  U/L


 


Alkaline Phosphatase   140 H   ()  U/L


 


Troponin I     (0.000-0.034)  ng/mL














  06/14/19 06/14/19 06/14/19 Range/Units





  12:18 19:52 19:52 


 


RBC     (4.30-5.90)  m/uL


 


MCV     (80.0-100.0)  fL


 


RDW     (11.5-15.5)  %


 


Plt Count     (150-450)  k/uL


 


Lymphocytes #     (1.0-4.8)  k/uL


 


PT     (9.0-12.0)  sec


 


INR     (<1.2)  


 


APTT   >200.0 H*   (22.0-30.0)  sec


 


D-Dimer     (<0.60)  mg/L FEU


 


BUN     (9-20)  mg/dL


 


Creatinine     (0.66-1.25)  mg/dL


 


Total Bilirubin     (0.2-1.3)  mg/dL


 


AST     (17-59)  U/L


 


Alkaline Phosphatase     ()  U/L


 


Troponin I  0.163 H*   0.136 H*  (0.000-0.034)  ng/mL














  06/14/19 06/15/19 06/15/19 Range/Units





  22:13 02:24 02:24 


 


RBC     (4.30-5.90)  m/uL


 


MCV     (80.0-100.0)  fL


 


RDW     (11.5-15.5)  %


 


Plt Count     (150-450)  k/uL


 


Lymphocytes #     (1.0-4.8)  k/uL


 


PT     (9.0-12.0)  sec


 


INR     (<1.2)  


 


APTT  >200.0 H*   57.3 H  (22.0-30.0)  sec


 


D-Dimer     (<0.60)  mg/L FEU


 


BUN     (9-20)  mg/dL


 


Creatinine     (0.66-1.25)  mg/dL


 


Total Bilirubin     (0.2-1.3)  mg/dL


 


AST     (17-59)  U/L


 


Alkaline Phosphatase     ()  U/L


 


Troponin I   0.136 H*   (0.000-0.034)  ng/mL














  06/15/19 06/15/19 Range/Units





  06:21 06:21 


 


RBC  4.28 L   (4.30-5.90)  m/uL


 


MCV  104.4 H   (80.0-100.0)  fL


 


RDW  17.8 H   (11.5-15.5)  %


 


Plt Count  122 L   (150-450)  k/uL


 


Lymphocytes #    (1.0-4.8)  k/uL


 


PT    (9.0-12.0)  sec


 


INR    (<1.2)  


 


APTT   67.5 H  (22.0-30.0)  sec


 


D-Dimer    (<0.60)  mg/L FEU


 


BUN    (9-20)  mg/dL


 


Creatinine    (0.66-1.25)  mg/dL


 


Total Bilirubin    (0.2-1.3)  mg/dL


 


AST    (17-59)  U/L


 


Alkaline Phosphatase    ()  U/L


 


Troponin I    (0.000-0.034)  ng/mL














Assessment and Plan


Assessment: 


1.  Acute onset dyspnea; right middle lobe pneumonia


- We will start patient on IV ceftriaxone along with oral azithromycin


- Bronchodilator nebulizer treatments with Proventil/Atrovent


- Sputum and blood cultures


- Consult pulmonary for further recommendations





2.  Elevated d-dimer; rule out PE


- CT chest PE protocol could not be done due to elevated creatinine; patient had

VQ scan done which was low probability for PE


- Pulmonary service consulted for further recommendations





3.  Non-ST elevation MI


- Patient started on aspirin, beta blockers and IV heparin per protocol


- Consult cardiology for further recommendations; 2-D echo





4.  Acute exacerbation CHF


- Patient takes Bumex 1 mg by mouth daily at home; we will hold latent start 

patient on IV Lasix 40 mg every 12 hours


- We will monitor strict KATHLEEN's, daily weights, renal function and electrolytes; 

restricted fluid and sodium diet


- Echocardiogram ordered; cardiology is to see for further recommendations





5.  Hypertension; patient takes Coreg and lisinopril at home; we will hold off 

on lisinopril secondary to acute renal injury





6.  Hyperlipidemia; atorvastatin 40 mg by mouth daily at bedtime





7.  Coronary artery disease/CHF; continue with aspirin, Coreg and Plavix; we'll 

hold off on Bumex while patient on IV Lasix





8.  Acute renal injury; we will hold off on lisinopril; avoid hypotension and 

nephrotoxins 


- Monitor strict KATHLEEN's and daily weights along with renal function and 

electrolyte monitoring 


- No IV fluids secondary to exacerbation of CHF 


- We will consult nephrology if renal function continues to deteriorate 





9.  DVT prophylaxis; systemic anticoagulation with heparin





CODE STATUS; DO NOT RESUSCITATE


Time with Patient: Greater than 30

## 2019-06-16 LAB
BASOPHILS # BLD AUTO: 0 K/UL (ref 0–0.2)
BASOPHILS NFR BLD AUTO: 1 %
EOSINOPHIL # BLD AUTO: 0.2 K/UL (ref 0–0.7)
EOSINOPHIL NFR BLD AUTO: 4 %
ERYTHROCYTE [DISTWIDTH] IN BLOOD BY AUTOMATED COUNT: 4.32 M/UL (ref 4.3–5.9)
ERYTHROCYTE [DISTWIDTH] IN BLOOD: 16 % (ref 11.5–15.5)
HCT VFR BLD AUTO: 45.3 % (ref 39–53)
HGB BLD-MCNC: 14.1 GM/DL (ref 13–17.5)
LYMPHOCYTES # SPEC AUTO: 1 K/UL (ref 1–4.8)
LYMPHOCYTES NFR SPEC AUTO: 19 %
MCH RBC QN AUTO: 32.7 PG (ref 25–35)
MCHC RBC AUTO-ENTMCNC: 31.2 G/DL (ref 31–37)
MCV RBC AUTO: 104.9 FL (ref 80–100)
MONOCYTES # BLD AUTO: 0.5 K/UL (ref 0–1)
MONOCYTES NFR BLD AUTO: 8 %
NEUTROPHILS # BLD AUTO: 3.6 K/UL (ref 1.3–7.7)
NEUTROPHILS NFR BLD AUTO: 65 %
PLATELET # BLD AUTO: 107 K/UL (ref 150–450)
WBC # BLD AUTO: 5.6 K/UL (ref 3.8–10.6)

## 2019-06-16 RX ADMIN — AZITHROMYCIN SCH MG: 250 TABLET, FILM COATED ORAL at 09:02

## 2019-06-16 RX ADMIN — CARVEDILOL SCH MG: 3.12 TABLET, FILM COATED ORAL at 17:10

## 2019-06-16 RX ADMIN — ATORVASTATIN CALCIUM SCH MG: 40 TABLET, FILM COATED ORAL at 20:45

## 2019-06-16 RX ADMIN — CARVEDILOL SCH MG: 3.12 TABLET, FILM COATED ORAL at 06:23

## 2019-06-16 RX ADMIN — CLOPIDOGREL BISULFATE SCH MG: 75 TABLET ORAL at 09:00

## 2019-06-16 RX ADMIN — BUMETANIDE SCH MG: 1 TABLET ORAL at 09:00

## 2019-06-16 RX ADMIN — OXYCODONE AND ACETAMINOPHEN PRN EACH: 5; 325 TABLET ORAL at 13:10

## 2019-06-16 RX ADMIN — ALLOPURINOL SCH MG: 300 TABLET ORAL at 09:00

## 2019-06-16 RX ADMIN — CEFAZOLIN SCH: 330 INJECTION, POWDER, FOR SOLUTION INTRAMUSCULAR; INTRAVENOUS at 15:48

## 2019-06-16 RX ADMIN — BUMETANIDE SCH MG: 1 TABLET ORAL at 15:55

## 2019-06-16 RX ADMIN — ASPIRIN 81 MG CHEWABLE TABLET SCH MG: 81 TABLET CHEWABLE at 09:00

## 2019-06-16 NOTE — P.PN
Subjective


Progress Note Date: 06/16/19





This is a 71-year-old gentleman with history of coronary artery disease with 

previous bypass surgery, possible cardiomyopathy and congestive heart failure, 

status post ICD placement with biventricular pacemaker.  Apparently the 

pacemaker was upgraded to by IV.  In December 2018.  Patient usually comes to 

[place] on in summer months and spends several weeks.  He has been in [place] area for about 5 weeks.  Was last 5 weeks.  Patient has been coughing and 

bringing up clear phlegm and has been having shortness of breath.  Denied any 

chest pain.  Doesn't appear patient has any proximal nocturnal dyspnea.  Denies 

any pedal swelling.  On admission his d-dimer was elevated and patient had a VQ 

scan which was low probability for pulmonary emboli.  She is being treated with 

IV Lasix.  A chest x-ray shows cardiomegaly but no overt congestive heart 

failure, but there is suspicion for possible pneumonia in the right middle lobe.

 Patient is seen by pulmonologist and is planning to continue treated with 

antibiotics.  His creatinine is up to 1.6.  His troponins are elevated but the 

pattern is not consistent with acute myocardial infarction.  I'm going to 

discontinue IV Lasix and start him on by mouth Bumex.  I'm going to get an 

echocardiogram done.  Further recommendation depending upon the clinical course








6/16: Patient states he has had some improvement of his shortness of breath.  

Today is his first time getting out of bed to a chair.  Echocardiogram is 

pending. Patient has been seen by Dr. Martinez for pneumonia and continued on 

antibiotics.  Patient is off heparin drip.  Continue Bumex oral for now.  He has

been afebrile, heart rate 110, blood pressure 105/68, pulse ox 96% on room air.





Objective





- Vital Signs


Vital signs: 


                                   Vital Signs











Temp  97.5 F L  06/16/19 08:00


 


Pulse  110 H  06/16/19 08:00


 


Resp  24   06/16/19 08:00


 


BP  105/68   06/16/19 08:00


 


Pulse Ox  96   06/16/19 08:00








                                 Intake & Output











 06/15/19 06/16/19 06/16/19





 18:59 06:59 18:59


 


Intake Total 700 150 400


 


Output Total 1250 700 150


 


Balance -550 -550 250


 


Weight 109 kg 109.8 kg 


 


Intake:   


 


  IV 50  


 


    cefTRIAXone 1 gm In 50  





    Sodium Chloride 0.9% 50   





    ml @ 100 mls/hr IVPB Q24H   





    [facility] Rx#:[id number]   


 


  Intake, IV Titration   160





  Amount   


 


    Sodium Chloride 0.9% 1,   160





    000 ml @ 20 mls/hr IV .   





    Q24H [facility] Rx#:[id number]   


 


  Oral 650 150 240


 


Output:   


 


  Urine 1250 700 150


 


Other:   


 


  Voiding Method Urinal Urinal Urinal


 


  # Voids 1 1 


 


  # Bowel Movements 1  














- Exam





GENERAL EXAM: Patient is alert and oriented and doesn't appear to be in any 

acute distress, seemed to chronically ill


HEENT: Normocephalic. Normal reaction of pupils, equal size, normal range of 

extraocular motion. No erythema or exudates in the throat.


NECK: No masses, no nuchal rigidity.


CHEST: No chest wall deformity.


LUNGS: Equal air entry with no crackles or wheeze.


HEART: S1 and S2 normal with no audible mumurs or gallops. Regular rhythm, 

femorals equal on both sides..


ABDOMEN: No hepatosplenomegaly, normal bowel sounds, no guarding or rigidity.


SKIN: No rashes


CENTRAL NERVOUS SYSTEM: No focal deficits.


EXTREMITIES: No cyanosis, clubbing or edema.








- Labs


CBC & Chem 7: 


                                 06/16/19 05:57





                                 06/14/19 12:18


Labs: 


                  Abnormal Lab Results - Last 24 Hours (Table)











  06/15/19 06/16/19 Range/Units





  12:58 05:57 


 


MCV   104.9 H  (80.0-100.0)  fL


 


RDW   16.0 H  (11.5-15.5)  %


 


Plt Count   107 L  (150-450)  k/uL


 


APTT  99.7 H   (22.0-30.0)  sec














Assessment and Plan


Plan: 





(1) Chronic systolic heart failure


Current Visit: Yes   Status: Acute   Code(s): I50.22 - CHRONIC SYSTOLIC 

(CONGESTIVE) HEART FAILURE   SNOMED Code(s): 606492564


   





(2) Elevated troponin acute coronary syndrome ruled out


Current Visit: Yes   Status: Acute   Code(s): R74.8 - ABNORMAL LEVELS OF OTHER 

SERUM ENZYMES   SNOMED Code(s): 518893775


   





(3) Pneumonia


Current Visit: Yes   Status: Acute   Code(s): J18.9 - PNEUMONIA, UNSPECIFIED 

ORGANISM   SNOMED Code(s): 318405481


   





(4) History of coronary artery bypass graft


Current Visit: Yes   Status: Acute   Code(s): Z95.1 - PRESENCE OF AORTOCORONARY 

BYPASS GRAFT   SNOMED Code(s): 019879409


   





(5) Ischemic cardiomyopathy


Current Visit: Yes   Status: Acute   Code(s): I25.5 - ISCHEMIC CARDIOMYOPATHY   

SNOMED Code(s): 508736314


   





(6) Cardiac defibrillator in place


Current Visit: Yes   Status: Acute   Code(s): Z95.810 - PRESENCE OF AUTOMATIC 

(IMPLANTABLE) CARDIAC DEFIBRILLATOR   SNOMED Code(s): 752213238


   


Plan: 


We will continue current management with antibiotics.  Continue Bumex 1 mg twice

daily but patient does not seem to be in significant fluid overload.  Monitor 

renal functions.  Obtain 2-D echocardiogram and Doppler study to assess cardiac 

structure and function.  Further recommendations depend upon the clinical 

course.








Nurse practitioner note has been reviewed, I agree with document findings and 

plan of care.  Vision was seen and examined.

## 2019-06-16 NOTE — P.PN
Subjective


Progress Note Date: 06/16/19


Principal diagnosis: 





Dyspnea, suspecting a right middle lobe pneumonia.





71-year-old male patient with known history of coronary artery disease, previous

bypass surgery, possible cardiomyopathy as the patient has a AICD with 

biventricular pacer and all of this work has been done in [place].  The patient 

also has history of bladder cancer receiving intravesicular chemotherapy lo

[place].  No history of any chronic lung disease or disorder.  The patient arrived

to [place] approximately 5 weeks ago.  This is his summer vacation.  During the

study was having increased cough and congestion and some increased shortness of 

breath.  The patient came into the hospital.  He was found to be in acute kidney

injury with a creatinine of 1.6.  His troponins were minimally elevated and t

here was evidence of troponin leak consistent with an acute non-STEMI.  The 

patient is a chest x-ray that showed some limited right middle lobe pulmonary 

infiltrates.  The pulmonary consultation was requested.  The patient is also is 

being seen by cardiology.  He is on room air for now.  No nausea.  No vomiting. 

No aspiration.  He does not utilize any form of oxygen or pulmonary medication 

on outpatient basis.  Baseline creatinine is not known.  Otherwise no other 

records available.  He is known to have peripheral vascular disease with 

previous intervention to his lower extremities.  He is also has undergone 

coronary stenting and previous bypass surgery.





The patient is seen today 06/16/2019 in follow-up on the selective care unit.  

He is currently resting comfortably in bed.  Awake and alert in no acute 

distress.  Breathing easier today as compared to yesterday.  Loose nonproductive

cough.  Maintaining good O2 saturations in the 90s on room air.  She's afebrile.

 Slightly tachycardic.  White count 5.6.  Hemoglobin 14.1.  Continued on 

ceftriaxone and azithromycin.





Objective





- Vital Signs


Vital signs: 


                                   Vital Signs











Temp  97.5 F L  06/16/19 08:00


 


Pulse  110 H  06/16/19 08:00


 


Resp  24   06/16/19 08:00


 


BP  105/68   06/16/19 08:00


 


Pulse Ox  96   06/16/19 08:00








                                 Intake & Output











 06/15/19 06/16/19 06/16/19





 18:59 06:59 18:59


 


Intake Total 700 150 400


 


Output Total 1250 700 150


 


Balance -550 -550 250


 


Weight 109 kg 109.8 kg 


 


Intake:   


 


  IV 50  


 


    cefTRIAXone 1 gm In 50  





    Sodium Chloride 0.9% 50   





    ml @ 100 mls/hr IVPB Q24H   





    [facility] Rx#:[id number]   


 


  Intake, IV Titration   160





  Amount   


 


    Sodium Chloride 0.9% 1,   160





    000 ml @ 20 mls/hr IV .   





    Q24H [facility] Rx#:[id number]   


 


  Oral 650 150 240


 


Output:   


 


  Urine 1250 700 150


 


Other:   


 


  Voiding Method Urinal Urinal Urinal


 


  # Voids 1 1 


 


  # Bowel Movements 1  1














- Exam





Gen. appearance, resting comfortably in bed, on room air, no acute distress


Head exam was generally normal. There was no scleral icterus or corneal arcus. 

Mucous membranes were moist.


Neck was supple and without jugular venous distension, thyromegaly, or carotid 

bruits. Carotids were easily palpable bilaterally. There was no adenopathy.


Lungs sounds are clear diminished in lung bases, few scattered rhonchi


Cardiac exam revealed the PMI to be normally situated and sized. The rhythm was 

regular and no extrasystoles were noted during several minutes of auscultation. 

The first and second heart sounds were normal and physiologic splitting of the 

second heart sound was noted. There were no murmurs, rubs, clicks, or gallops.  

The patient is a pacemaker pocket in the left anterior chest area in addition to

sternotomy scar which is dry clean and intact.


Abdominal exam revealed normal bowel sounds. The abdomen was soft, non-tender, 

and without masses, organomegaly, or appreciable enlargement of the abdominal 

aorta.


Examination of the extremities revealed easily palpable yet diminished radial, 

femoral and pedal pulses. There was no cyanosis, clubbing or edema.


Examination of the skin revealed no evidence of significant rashes, suspicious 

appearing nevi or other concerning lesions.





- Labs


CBC & Chem 7: 


                                 06/16/19 05:57





                                 06/14/19 12:18


Labs: 


                  Abnormal Lab Results - Last 24 Hours (Table)











  06/15/19 06/16/19 Range/Units





  12:58 05:57 


 


MCV   104.9 H  (80.0-100.0)  fL


 


RDW   16.0 H  (11.5-15.5)  %


 


Plt Count   107 L  (150-450)  k/uL


 


APTT  99.7 H   (22.0-30.0)  sec














Assessment and Plan


Assessment: 





Impression:





1 shortness of breath, subacute, suspecting a right middle lobe pneumonia





2 coronary artery disease with a previous coronary artery bypass surgery.  

Suspect a possible STEMI as the patient had elevation in  troponins





3 CHF, suspected





4 kidney injury, likely acute





5 elevated d-dimer with a low probability VQ scan, unlikely to be related to 

pulmonary embolism





6 hypertension





7 hyperlipidemia





8 peripheral vascular disease





9 history of AICD/pacer insertion





Plan:





The patient was seen and evaluated by Dr. Martinez.  He is improved today as c

ompared to yesterday.  Continue current treatment plan.  Follow-up chest x-ray 

in the a.m.  Probable discharge in the a.m.  We'll continue to follow.





I, the cosigning physician, performed a history & physical examination of the 

patient. Lungs sounds with few scattered rhonchi.  Maintaining good O2 

saturations in the 90s on room air.  I discussed the assessment and plan of care

with my nurse practitioner, Amparo Slade. I attest to the above note as dictated 

by her.

## 2019-06-16 NOTE — P.PN
Subjective


Progress Note Date: 06/16/19


Principal diagnosis: 


right middle lobe pneumonia


Non-ST elevation MI


Acute exacerbation CHF





71-year-old male patient with known history of coronary artery disease, previous

bypass surgery, possible cardiomyopathy as the patient has a AICD with 

biventricular pacer and all of this work has been done in [place].  The patient 

also has history of bladder cancer receiving intravesicular chemotherapy 

locally.  No history of any chronic lung disease or disorder.  The patient 

arrived to [place] approximately 5 weeks ago.  This is his summer vacation.  

During the study was having increased cough and congestion and some increased 

shortness of breath.  The patient came into the hospital.  He was found to be in

acute kidney injury with a creatinine of 1.6.  His troponins were minimally 

elevated and there was evidence of troponin leak consistent with an acute non-

STEMI.  The patient is a chest x-ray that showed some limited right middle lobe 

pulmonary infiltrates.





06/15/2019


Patient is seen and evaluated in room at bedside; remains somewhat lethargic but

does report respond to verbal stimulation


vital signs show temperature of 97.6, pulse 114, respiration 20 and blood 

pressure of 84/64; SpO2 of 96% on room air


Lab review shows a normal white blood count; troponin I was 0.136


Patient is started on IV ceftriaxone and azithromycin; pulmonary service agrees 

with above


Cardiology is following and recommending to continue with.  IV antibiotics since

pneumonia seems to be etiology of shortness of breath; IV Lasix has been 

discontinued; echocardiogram is ordered and cardiology will make further 

recommendations after echo is reported





06/16/2019 


Patient is seen and evaluated in room in follow-up on the selective care unit.  

He is currently resting comfortably in bed.  Awake and alert in no acute 

distress.  Breathing easier today as compared to yesterday.  Loose nonproductive

cough.  Maintaining good O2 saturations in the 90s on room air.  She's afebrile.

 Slightly tachycardic.  White count 5.6.  Hemoglobin 14.1.  Continued on 

ceftriaxone and azithromycin.





Objective





- Vital Signs


Vital signs: 


                                   Vital Signs











Temp  96.1 F L  06/16/19 11:30


 


Pulse  113 H  06/16/19 11:30


 


Resp  24   06/16/19 11:30


 


BP  103/78   06/16/19 11:30


 


Pulse Ox  97   06/16/19 11:30








                                 Intake & Output











 06/15/19 06/16/19 06/16/19





 18:59 06:59 18:59


 


Intake Total 700 150 400


 


Output Total 1250 700 150


 


Balance -550 -550 250


 


Weight 109 kg 109.8 kg 


 


Intake:   


 


  IV 50  


 


    cefTRIAXone 1 gm In 50  





    Sodium Chloride 0.9% 50   





    ml @ 100 mls/hr IVPB Q24H   





    [facility] Rx#:[id number]   


 


  Intake, IV Titration   160





  Amount   


 


    Sodium Chloride 0.9% 1,   160





    000 ml @ 20 mls/hr IV .   





    Q24H [facility] Rx#:[id number]   


 


  Oral 650 150 240


 


Output:   


 


  Urine 1250 700 150


 


Other:   


 


  Voiding Method Urinal Urinal Urinal


 


  # Voids 1 1 


 


  # Bowel Movements 1  1














- Exam


- Constitutional


General appearance: Present: average body habitus, cooperative, no acute 

distress


- EENT


Eyes: Present: anicteric sclerae, EOMI, PERRLA, normal appearance


ENT: Present: hearing grossly normal, normal oropharynx


Ears: bilateral: normal


- Neck


Neck: Present: normal ROM.  Absent: lymphadenopathy, rigidity, thyromegaly


Carotids: negative: bruit present


Thyroid: bilateral: normal size, negative: enlarged, nodule


- Respiratory


Respiratory: bilateral: CTA, negative: rales, rhonchi, wheezing


- Cardiovascular


Rhythm: regular


Heart sounds: normal: S1, S2


Abnormal Heart Sounds: Absent: systolic murmur, diastolic murmur


- Gastrointestinal


General gastrointestinal: Present: normal bowel sounds, soft.  Absent: distende

d, organomegaly, tenderness


- Genitourinary


Genitourinary Comment(s): deferred


- Integumentary


Integumentary: Present: normal turgor.  Absent: jaundiced, rash, ulcer


- Neurologic


Neurologic: Present: CNII-XII intact.  Absent: focal deficits


- Musculoskeletal


Musculoskeletal: Present: gait normal, strength equal bilaterally


- Psychiatric


Psychiatric: Present: A&O x's 3, appropriate affect, intact judgment & insight











- Labs


CBC & Chem 7: 


                                 06/16/19 05:57





                                 06/14/19 12:18


Labs: 


                  Abnormal Lab Results - Last 24 Hours (Table)











  06/15/19 06/16/19 Range/Units





  12:58 05:57 


 


MCV   104.9 H  (80.0-100.0)  fL


 


RDW   16.0 H  (11.5-15.5)  %


 


Plt Count   107 L  (150-450)  k/uL


 


APTT  99.7 H   (22.0-30.0)  sec














Assessment and Plan


Assessment: 


1.  Acute onset dyspnea; right middle lobe pneumonia


- We will start patient on IV ceftriaxone along with oral azithromycin


- Bronchodilator nebulizer treatments with Proventil/Atrovent


- Sputum and blood cultures


- Consult pulmonary for further recommendations





2.  Elevated d-dimer; rule out PE


- CT chest PE protocol could not be done due to elevated creatinine; patient had

VQ scan done which was low probability for PE


- Pulmonary service consulted for further recommendations





3.  Non-ST elevation MI


- Patient started on aspirin, beta blockers and IV heparin per protocol


- Consult cardiology for further recommendations; 2-D echo





4.  Acute exacerbation CHF


- Patient takes Bumex 1 mg by mouth daily at home; we will hold latent start pa

tient on IV Lasix 40 mg every 12 hours


- We will monitor strict KATHLEEN's, daily weights, renal function and electrolytes; 

restricted fluid and sodium diet


- Echocardiogram ordered; cardiology is to see for further recommendations





5.  Hypertension; patient takes Coreg and lisinopril at home; we will hold off 

on lisinopril secondary to acute renal injury





6.  Hyperlipidemia; atorvastatin 40 mg by mouth daily at bedtime





7.  Coronary artery disease/CHF; continue with aspirin, Coreg and Plavix; we'll 

hold off on Bumex while patient on IV Lasix





8.  Acute renal injury; we will hold off on lisinopril; avoid hypotension and 

nephrotoxins 


- Monitor strict KATHLEEN's and daily weights along with renal function and 

electrolyte monitoring 


- No IV fluids secondary to exacerbation of CHF 


- We will consult nephrology if renal function continues to deteriorate 





9.  DVT prophylaxis; systemic anticoagulation with heparin





CODE STATUS; DO NOT RESUSCITATE


Time with Patient: Greater than 30

## 2019-06-17 LAB
BASOPHILS # BLD AUTO: 0 K/UL (ref 0–0.2)
BASOPHILS NFR BLD AUTO: 1 %
EOSINOPHIL # BLD AUTO: 0.1 K/UL (ref 0–0.7)
EOSINOPHIL NFR BLD AUTO: 2 %
ERYTHROCYTE [DISTWIDTH] IN BLOOD BY AUTOMATED COUNT: 4.65 M/UL (ref 4.3–5.9)
ERYTHROCYTE [DISTWIDTH] IN BLOOD: 17.3 % (ref 11.5–15.5)
HCT VFR BLD AUTO: 48.4 % (ref 39–53)
HGB BLD-MCNC: 14.9 GM/DL (ref 13–17.5)
LYMPHOCYTES # SPEC AUTO: 1 K/UL (ref 1–4.8)
LYMPHOCYTES NFR SPEC AUTO: 19 %
MCH RBC QN AUTO: 32 PG (ref 25–35)
MCHC RBC AUTO-ENTMCNC: 30.7 G/DL (ref 31–37)
MCV RBC AUTO: 104.1 FL (ref 80–100)
MONOCYTES # BLD AUTO: 0.4 K/UL (ref 0–1)
MONOCYTES NFR BLD AUTO: 8 %
NEUTROPHILS # BLD AUTO: 3.6 K/UL (ref 1.3–7.7)
NEUTROPHILS NFR BLD AUTO: 67 %
PLATELET # BLD AUTO: 102 K/UL (ref 150–450)
WBC # BLD AUTO: 5.3 K/UL (ref 3.8–10.6)

## 2019-06-17 RX ADMIN — CARVEDILOL SCH MG: 3.12 TABLET, FILM COATED ORAL at 06:30

## 2019-06-17 RX ADMIN — ATORVASTATIN CALCIUM SCH MG: 40 TABLET, FILM COATED ORAL at 19:56

## 2019-06-17 RX ADMIN — BUMETANIDE SCH MG: 1 TABLET ORAL at 15:29

## 2019-06-17 RX ADMIN — CEFAZOLIN SCH: 330 INJECTION, POWDER, FOR SOLUTION INTRAMUSCULAR; INTRAVENOUS at 13:44

## 2019-06-17 RX ADMIN — OXYCODONE AND ACETAMINOPHEN PRN EACH: 5; 325 TABLET ORAL at 18:20

## 2019-06-17 RX ADMIN — AZITHROMYCIN SCH MG: 250 TABLET, FILM COATED ORAL at 09:22

## 2019-06-17 RX ADMIN — DOCUSATE SODIUM AND SENNOSIDES SCH EACH: 50; 8.6 TABLET ORAL at 09:22

## 2019-06-17 RX ADMIN — BUMETANIDE SCH MG: 1 TABLET ORAL at 09:22

## 2019-06-17 RX ADMIN — ASPIRIN 81 MG CHEWABLE TABLET SCH MG: 81 TABLET CHEWABLE at 09:22

## 2019-06-17 RX ADMIN — CLOPIDOGREL BISULFATE SCH MG: 75 TABLET ORAL at 09:22

## 2019-06-17 RX ADMIN — CARVEDILOL SCH MG: 3.12 TABLET, FILM COATED ORAL at 18:17

## 2019-06-17 RX ADMIN — ALLOPURINOL SCH MG: 300 TABLET ORAL at 09:22

## 2019-06-17 RX ADMIN — HEPARIN SODIUM SCH UNIT: 5000 INJECTION, SOLUTION INTRAVENOUS; SUBCUTANEOUS at 19:56

## 2019-06-17 NOTE — PN
PROGRESS NOTE



DATE OF SERVICE:

06/17/2019



INTERVAL HISTORY:

This 79-year-old gentleman who was admitted with shortness of breath possibly 
had CHF

acute exacerbation with possible right middle lobe pneumonia.  The patient also 
had

elevated troponin.  The most recent chest x-ray reviewed personally by me showed

bibasilar atelectatic changes.  The patient was seen by multiple consultants at 
this

time,  followed by Pulmonary and as well as Cardiology also.  The patient also

receiving antibiotics for the right middle lobe pneumonia.



PAST MEDICAL HISTORY:

Review.



REVIEW OF SYMPTOMS:

Cardiovascular:  No angina or palpitations.

Respirations: As mentioned earlier.

GI: As mentioned earlier.

: No dysuria.

CENTRAL NERVOUS SYSTEM: No numbness or weakness.



CURRENT MEDICATIONS:

Reviewed and include:

1. Zyloprim 300 mg daily.

2. Aspirin 81 mg.

3. Lipitor 40 mg.

4. Zithromax 250 mg.

5. Bumex 1 mg b.i.d.

6. Coreg 3.125 mg b.i.d.

7. Omnicef 300 mg p.o. b.i.d.

8. Plavix 75 mg p.o. b.i.d.

9. Percocet 5 mg q.4 p.r.n.

11.P.r.n. medications.



PHYSICAL EXAMINATION:

The patient is alert, oriented x3, pulse 106.  Blood pressure 109/85, 
respirations 16,

temp 97 degrees, pulse ox 98% on room air.

HEENT: Conjunctivae normal.  Oral mucosa moist.

NECK is no jugular venous distention.  No carotid bruit. No lymph node 
enlargement.

CARDIOVASCULAR: S1, S2 muffled.

RESPIRATORY: Breath sounds diminished in the bases.  Scattered rhonchi and 
crackles.

ABDOMEN:  Soft, nontender.

LEGS:  No edema. No swelling.

CENTRAL NERVOUS SYSTEM: No focal deficits.



LAB STUDIES:

WBC 5.4, hemoglobin 14.9, sodium 142, potassium 4, creatinine is 1.63, AST 16, 
ALT 61.

Troponin 0.136.



ASSESSMENT:

1. Shortness of breath with possible congestive heart failure acute exacerbation
with

    ejection fraction unknown.

2. Right middle lobe pneumonia.

3. History of coronary artery disease, coronary artery bypass grafting.

4. Elevated D-dimer with ruled out.

5. Acute non ST elevation myocardial infarction.

6. Hypertension.

7. Hyperlipidemia.

8. Acute renal injury.

9. Troponin 0.13.

10.History of coronary artery disease.

11.History of bladder cancer.

12.History of peripheral vascular disease.

13.History of coronary artery disease,  coronary artery bypass grafting, stent.

14.History AICD.

15.Remote history of nicotine dependence.

16.Obesity with body mass of 34.7.

17.NO CODE, NO CPR, NO VENT.



RECOMMENDATIONS AND DISCUSSION:

This 79-year-old gentleman who presented with multiple complex medical issues, 
we will

monitor the patient closely, continue the current medications, management and

symptomatic treatment. We will continue the broad-spectrum IV antibiotics.  
Otherwise,

the most recent chest x-ray showed evidence of possible right middle lobe 
pneumonia.

We will continue to monitor.  I would also recommend PT/OT evaluation as well.  
Closely

monitor.  Follow with Pulmonary and Cardiology.  Guarded prognosis.  Further

recommendations to follow.





BARRERA / DIGNA: [id number] / Job#: [id number]

LONNIE

## 2019-06-17 NOTE — XR
EXAMINATION TYPE: XR chest 2V

 

DATE OF EXAM: 6/17/2019

 

COMPARISON: Prior chest x-ray dated 6/14/2019

 

HISTORY: Pneumonia

 

TECHNIQUE:  Frontal and lateral views of the chest are obtained.

 

FINDINGS:  Patient is post median sternotomy and the heart remains enlarged. Intracardiac defibrillat
or leads are stable. Postop changes are noted to the spine. No evident pneumothorax or pleural effusi
on. Subsegmental basilar atelectatic changes are suspected. Prominent lung lines are compatible with 
underlying COPD.

 

IMPRESSION:  Suspect some subluxation at all basilar atelectatic changes. Essentially stable exam. Ca
rdiomegaly. Additional findings above.

## 2019-06-17 NOTE — P.PN
Subjective


Progress Note Date: 06/17/19





This is a 71-year-old gentleman with history of coronary artery disease with 

previous bypass surgery, possible cardiomyopathy and congestive heart failure, 

status post ICD placement with biventricular pacemaker.  Apparently the 

pacemaker was upgraded to by IV.  In December 2018.  Patient usually comes to 

[place] on in summer months and spends several weeks.  He has been in [place] area for about 5 weeks.  Was last 5 weeks.  Patient has been coughing and 

bringing up clear phlegm and has been having shortness of breath.  Denied any 

chest pain.  Doesn't appear patient has any proximal nocturnal dyspnea.  Denies 

any pedal swelling.  On admission his d-dimer was elevated and patient had a VQ 

scan which was low probability for pulmonary emboli.  She is being treated with 

IV Lasix.  A chest x-ray shows cardiomegaly but no overt congestive heart 

failure, but there is suspicion for possible pneumonia in the right middle lobe.

 Patient is seen by pulmonologist and is planning to continue treated with 

antibiotics.  His creatinine is up to 1.6.  His troponins are elevated but the 

pattern is not consistent with acute myocardial infarction.  I'm going to 

discontinue IV Lasix and start him on by mouth Bumex.  I'm going to get an 

echocardiogram done.  Further recommendation depending upon the clinical course.








06/17/2019


Patient was seen and examined this morning, states that his breathing is overall

stable.  Mild dyspnea on exertion.  Afebrile and hemodynamically stable.  Chest 

x-ray shows some subtle subsegmental atelectatic changes to the bases with COPD.

 No evidence of congestive cardiac failure.





Objective





- Vital Signs


Vital signs: 


                                   Vital Signs











Temp  97 F L  06/17/19 12:05


 


Pulse  106 H  06/17/19 12:05


 


Resp  16   06/17/19 12:05


 


BP  109/85   06/17/19 12:05


 


Pulse Ox  96   06/17/19 12:05








                                 Intake & Output











 06/16/19 06/17/19 06/17/19





 18:59 06:59 18:59


 


Intake Total 690 150 600


 


Output Total 300 375 250


 


Balance 390 -225 350


 


Weight  109.6 kg 


 


Intake:   


 


  IV 50  


 


    cefTRIAXone 1 gm In 50  





    Sodium Chloride 0.9% 50   





    ml @ 100 mls/hr IVPB Q24H   





    [facility] Rx#:[id number]   


 


  Intake, IV Titration 160  





  Amount   


 


    Sodium Chloride 0.9% 1, 160  





    000 ml @ 20 mls/hr IV .   





    Q24H [facility] Rx#:[id number]   


 


  Oral 480 150 600


 


Output:   


 


  Urine 300 375 250


 


Other:   


 


  Voiding Method Urinal Urinal 


 


  # Voids  1 


 


  # Bowel Movements 1  














- Exam





Gen. appearance, resting comfortably in bed, on room air, no acute distress


Head exam was generally normal. There was no scleral icterus or corneal arcus. 

Mucous membranes were moist.


Neck was supple and without jugular venous distension, thyromegaly, or carotid 

bruits. Carotids were easily palpable bilaterally. There was no adenopathy.


Lungs sounds are clear diminished in lung bases, few scattered rhonchi


Cardiac exam revealed the PMI to be normally situated and sized. The rhythm was 

regular and no extrasystoles were noted during several minutes of auscultation. 

The first and second heart sounds were normal and physiologic splitting of the 

second heart sound was noted. There were no murmurs, rubs, clicks, or gallops.  

The patient is a pacemaker pocket in the left anterior chest area in addition to

sternotomy scar which is dry clean and intact.


Abdominal exam revealed normal bowel sounds. The abdomen was soft, non-tender, 

and without masses, organomegaly, or appreciable enlargement of the abdominal 

aorta.


Examination of the extremities revealed easily palpable yet diminished radial, 

femoral and pedal pulses. There was no cyanosis, clubbing or edema.


Examination of the skin revealed no evidence of significant rashes, suspicious 

appearing nevi or other concerning lesions.








- Labs


CBC & Chem 7: 


                                 06/17/19 05:59





                                 06/14/19 12:18


Labs: 


                  Abnormal Lab Results - Last 24 Hours (Table)











  06/17/19 Range/Units





  05:59 


 


MCV  104.1 H  (80.0-100.0)  fL


 


MCHC  30.7 L  (31.0-37.0)  g/dL


 


RDW  17.3 H  (11.5-15.5)  %


 


Plt Count  102 L  (150-450)  k/uL














Assessment and Plan


Plan: 





Impression:





1 shortness of breath, subacute, suspecting a right middle lobe pneumonia 

improved





2 coronary artery disease with a previous coronary artery bypass surgery.  S

uspect a possible STEMI as the patient had elevation in  troponins





3 CHF, suspected





4 kidney injury, likely acute





5 elevated d-dimer with a low probability VQ scan, unlikely to be related to 

pulmonary embolism





6 hypertension





7 hyperlipidemia





8 peripheral vascular disease





9 history of AICD/pacer insertion





Plan





From cardiology's perspective, we'll follow this patient with you now on an as-n

eeded basis only, please don't hesitate to call with any questions.





DNP note has been reviewed, I agree with a documented findings and plan of care.

 Patient was seen and examined.

## 2019-06-17 NOTE — P.PN
Subjective


Progress Note Date: 06/17/19


Principal diagnosis: 





Dyspnea, suspecting a right middle lobe pneumonia.





71-year-old male patient with known history of coronary artery disease, previous

bypass surgery, possible cardiomyopathy as the patient has a AICD with 

biventricular pacer and all of this work has been done in [place].  The patient 

also has history of bladder cancer receiving intravesicular chemotherapy lo

ayleen.  No history of any chronic lung disease or disorder.  The patient arrived

to [place] approximately 5 weeks ago.  This is his summer vacation.  During the

study was having increased cough and congestion and some increased shortness of 

breath.  The patient came into the hospital.  He was found to be in acute kidney

injury with a creatinine of 1.6.  His troponins were minimally elevated and t

here was evidence of troponin leak consistent with an acute non-STEMI.  The 

patient is a chest x-ray that showed some limited right middle lobe pulmonary 

infiltrates.  The pulmonary consultation was requested.  The patient is also is 

being seen by cardiology.  He is on room air for now.  No nausea.  No vomiting. 

No aspiration.  He does not utilize any form of oxygen or pulmonary medication 

on outpatient basis.  Baseline creatinine is not known.  Otherwise no other 

records available.  He is known to have peripheral vascular disease with 

previous intervention to his lower extremities.  He is also has undergone 

coronary stenting and previous bypass surgery.





The patient is seen today 06/16/2019 in follow-up on the selective care unit.  

He is currently resting comfortably in bed.  Awake and alert in no acute 

distress.  Breathing easier today as compared to yesterday.  Loose nonproductive

cough.  Maintaining good O2 saturations in the 90s on room air.  She's afebrile.

 Slightly tachycardic.  White count 5.6.  Hemoglobin 14.1.  Continued on 

ceftriaxone and azithromycin.





The patient is seen today 06/17/2019 in follow-up on the selective care unit.  

He denies any worsening shortness of breath cough or congestion.  Some dyspnea 

on exertion.  Maintaining O2 saturations in the 90s on room air.  He's been 

afebrile.  Hemodynamically stable.  Chest x-ray reveals some subtle segmental 

atelectatic changes at the bases.  COPD.  White count 5.3.  Hemoglobin 14.9.  He

remains on azithromycin.





Objective





- Vital Signs


Vital signs: 


                                   Vital Signs











Temp  97 F L  06/17/19 12:05


 


Pulse  106 H  06/17/19 12:05


 


Resp  16   06/17/19 12:05


 


BP  109/85   06/17/19 12:05


 


Pulse Ox  96   06/17/19 12:05








                                 Intake & Output











 06/16/19 06/17/19 06/17/19





 18:59 06:59 18:59


 


Intake Total 690 150 600


 


Output Total 300 375 250


 


Balance 390 -225 350


 


Weight  109.6 kg 


 


Intake:   


 


  IV 50  


 


    cefTRIAXone 1 gm In 50  





    Sodium Chloride 0.9% 50   





    ml @ 100 mls/hr IVPB Q24H   





    [facility] Rx#:[id number]   


 


  Intake, IV Titration 160  





  Amount   


 


    Sodium Chloride 0.9% 1, 160  





    000 ml @ 20 mls/hr IV .   





    Q24H [facility] Rx#:[id number]   


 


  Oral 480 150 600


 


Output:   


 


  Urine 300 375 250


 


Other:   


 


  Voiding Method Urinal Urinal 


 


  # Voids  1 


 


  # Bowel Movements 1  














- Exam





Gen. appearance, resting comfortably in bed, on room air, no acute distress


Head exam was generally normal. There was no scleral icterus or corneal arcus. 

Mucous membranes were moist.


Neck was supple and without jugular venous distension, thyromegaly, or carotid 

bruits. Carotids were easily palpable bilaterally. There was no adenopathy.


Lungs sounds are clear diminished in lung bases, few scattered rhonchi


Cardiac exam revealed the PMI to be normally situated and sized. The rhythm was 

regular and no extrasystoles were noted during several minutes of auscultation. 

The first and second heart sounds were normal and physiologic splitting of the 

second heart sound was noted. There were no murmurs, rubs, clicks, or gallops.  

The patient is a pacemaker pocket in the left anterior chest area in addition to

sternotomy scar which is dry clean and intact.


Abdominal exam revealed normal bowel sounds. The abdomen was soft, non-tender, 

and without masses, organomegaly, or appreciable enlargement of the abdominal 

aorta.


Examination of the extremities revealed easily palpable yet diminished radial, 

femoral and pedal pulses. There was no cyanosis, clubbing or edema.


Examination of the skin revealed no evidence of significant rashes, suspicious 

appearing nevi or other concerning lesions.





- Labs


CBC & Chem 7: 


                                 06/17/19 05:59





                                 06/14/19 12:18


Labs: 


                  Abnormal Lab Results - Last 24 Hours (Table)











  06/17/19 Range/Units





  05:59 


 


MCV  104.1 H  (80.0-100.0)  fL


 


MCHC  30.7 L  (31.0-37.0)  g/dL


 


RDW  17.3 H  (11.5-15.5)  %


 


Plt Count  102 L  (150-450)  k/uL














Assessment and Plan


Assessment: 





Impression:





1 shortness of breath, subacute, suspecting a right middle lobe pneumonia 

improved





2 coronary artery disease with a previous coronary artery bypass surgery.  

Suspect a possible STEMI as the patient had elevation in  troponins





3 CHF, suspected





4 kidney injury, likely acute





5 elevated d-dimer with a low probability VQ scan, unlikely to be related to 

pulmonary embolism





6 hypertension





7 hyperlipidemia





8 peripheral vascular disease





9 history of AICD/pacer insertion





Plan:





The patient was seen and evaluated by Dr. Hernandez.  He is cleared for discharge 

from the pulmonary standpoint.  Complete a course of antibiotics.  We'll see as 

needed.





I, the cosigning physician, performed a history & physical examination of the 

patient. Lungs sounds with few scattered rhonchi.  Maintaining good O2 

saturations in the 90s on room air.  I discussed the assessment and plan of care

with my nurse practitioner, Amparo Slade. I attest to the above note as dictated 

by her.

## 2019-06-18 VITALS — RESPIRATION RATE: 18 BRPM

## 2019-06-18 VITALS — DIASTOLIC BLOOD PRESSURE: 67 MMHG | TEMPERATURE: 97.4 F | HEART RATE: 118 BPM | SYSTOLIC BLOOD PRESSURE: 93 MMHG

## 2019-06-18 LAB
ANION GAP SERPL CALC-SCNC: 9 MMOL/L
BASOPHILS # BLD AUTO: 0 K/UL (ref 0–0.2)
BASOPHILS NFR BLD AUTO: 1 %
BUN SERPL-SCNC: 41 MG/DL (ref 9–20)
CALCIUM SPEC-MCNC: 9.5 MG/DL (ref 8.4–10.2)
CHLORIDE SERPL-SCNC: 99 MMOL/L (ref 98–107)
CO2 SERPL-SCNC: 28 MMOL/L (ref 22–30)
EOSINOPHIL # BLD AUTO: 0.1 K/UL (ref 0–0.7)
EOSINOPHIL NFR BLD AUTO: 1 %
ERYTHROCYTE [DISTWIDTH] IN BLOOD BY AUTOMATED COUNT: 4.52 M/UL (ref 4.3–5.9)
ERYTHROCYTE [DISTWIDTH] IN BLOOD: 16.3 % (ref 11.5–15.5)
GLUCOSE SERPL-MCNC: 99 MG/DL (ref 74–99)
HCT VFR BLD AUTO: 47.2 % (ref 39–53)
HGB BLD-MCNC: 14.8 GM/DL (ref 13–17.5)
LYMPHOCYTES # SPEC AUTO: 1 K/UL (ref 1–4.8)
LYMPHOCYTES NFR SPEC AUTO: 19 %
MCH RBC QN AUTO: 32.7 PG (ref 25–35)
MCHC RBC AUTO-ENTMCNC: 31.4 G/DL (ref 31–37)
MCV RBC AUTO: 104.2 FL (ref 80–100)
MONOCYTES # BLD AUTO: 0.4 K/UL (ref 0–1)
MONOCYTES NFR BLD AUTO: 8 %
NEUTROPHILS # BLD AUTO: 3.6 K/UL (ref 1.3–7.7)
NEUTROPHILS NFR BLD AUTO: 68 %
PLATELET # BLD AUTO: 112 K/UL (ref 150–450)
POTASSIUM SERPL-SCNC: 4.5 MMOL/L (ref 3.5–5.1)
SODIUM SERPL-SCNC: 136 MMOL/L (ref 137–145)
WBC # BLD AUTO: 5.2 K/UL (ref 3.8–10.6)

## 2019-06-18 RX ADMIN — BUMETANIDE SCH MG: 1 TABLET ORAL at 08:58

## 2019-06-18 RX ADMIN — DOCUSATE SODIUM AND SENNOSIDES SCH EACH: 50; 8.6 TABLET ORAL at 08:57

## 2019-06-18 RX ADMIN — CARVEDILOL SCH MG: 3.12 TABLET, FILM COATED ORAL at 06:27

## 2019-06-18 RX ADMIN — HEPARIN SODIUM SCH UNIT: 5000 INJECTION, SOLUTION INTRAVENOUS; SUBCUTANEOUS at 08:58

## 2019-06-18 RX ADMIN — ASPIRIN 81 MG CHEWABLE TABLET SCH MG: 81 TABLET CHEWABLE at 08:57

## 2019-06-18 RX ADMIN — CLOPIDOGREL BISULFATE SCH MG: 75 TABLET ORAL at 08:57

## 2019-06-18 RX ADMIN — AZITHROMYCIN SCH MG: 250 TABLET, FILM COATED ORAL at 08:57

## 2019-06-18 RX ADMIN — ALLOPURINOL SCH MG: 300 TABLET ORAL at 08:57

## 2019-06-18 NOTE — ECHOF
Referral Reason:Chest pain and cardiomyopathy



MEASUREMENTS

--------

HEIGHT: 177.8 cm

WEIGHT: 109.8 kg

BP: 116/78

IVSd:   1.1 cm     (0.6 - 1.1)

LVIDd:   6.9 cm     (3.9 - 5.3)

LVPWd:   0.8 cm     (0.6 - 1.1)

IVSs:   1.0 cm

LVIDs:   6.7 cm

LVPWs:   0.9 cm

LAESV Index (A-L):   1510 ml/m

Ao Diam:   3.7 cm     (2.0 - 3.7)

AV Cusp:   2.3 cm     (1.5 - 2.6)

RAP:   20.00 mmHg

RVSP:   55.98 mmHg







FINDINGS

--------

Pacerwire seen in RV and RA.

This was a technically good study.

The left ventricle is severely dilated.   Left ventricular wall thickness is normal.   There is sever
e global hypokinesis of LV .   Overall left ventricular systolic function is severely impaired with, 
an EF < 20%.

The right ventricle is normal in size.

The left atrium is moderately dilated.

The right atrial size is normal.

There is mild aortic valve sclerosis.   There is mild aortic regurgitation.

Moderate mitral annular calcification present.   Moderate mitral regurgitation is present.

Moderate tricuspid regurgitation present.   There is moderate pulmonary hypertension.   The right henri
tricular systolic pressure, as measured by Doppler, is 55mmHg.

The pulmonic valve was not well visualized.

There is no pericardial effusion.



CONCLUSIONS

--------

1. Pacerwire seen in RV and RA.

2. This was a technically good study.

3. The left ventricle is severely dilated.

4. Left ventricular wall thickness is normal.

5. There is severe global hypokinesis of LV .

6. Overall left ventricular systolic function is severely impaired with, an EF < 20%.

7. The left atrium is moderately dilated.

8. There is mild aortic valve sclerosis.

9. There is mild aortic regurgitation.

10. Moderate mitral annular calcification present.

11. Moderate mitral regurgitation is present.

12. Moderate tricuspid regurgitation present.

13. There is moderate pulmonary hypertension.

14. The pulmonic valve was not well visualized.

15. There is no pericardial effusion.





SONOGRAPHER: Eduin Krause RDCS T

## 2019-06-19 NOTE — DS
DISCHARGE SUMMARY



FINAL DIAGNOSES:

1. Shortness of breath, possible congestive heart failure acute exacerbation with

    acute on chronic systolic dysfunction, ejection fraction less than 20%.

2. Multiple valvular abnormalities including moderate annular calcification as well as

    mitral regurgitation and tricuspid regurgitation.

3. Right middle lobe pneumonia.

4. History of coronary artery disease, coronary artery bypass grafting.

5. Elevated D-dimer with pulmonary embolism ruled out.

6. Acute non ST elevation myocardial infarction, present on admission.

7. Hypertension.

8. Hyperlipidemia.

9. Acute renal injury.

10.Troponin 0.13.

11.History of coronary artery disease.

12.History of bladder cancer.

13.History of peripheral vascular disease.

14.History of coronary artery disease, coronary artery bypass grafting, stent.

15.History of AICD.

16.History of nicotine dependence.

17.Obesity with body mass index 34.7.

18.NO CODE, NO CPR, NO VENT.



DISCHARGE DISPOSITION:

The patient will be discharged in stable condition with guarded prognosis. Total time

taken 35 minutes.



HISTORY OF PRESENT ILLNESS:

This 79-year-old gentleman [place] with CHF

acute exacerbation. Patient also had right middle lobe pneumonia.  Patient treated with

IV diuretics and as well as antibiotics.  Patient improved significantly. Cardiology

and Pulmonology saw the patient.  Ejection fraction less than 20%.  Medications were

adjusted. Patient improved significantly. I recommend follow up with primary physician

as well as Cardiology closely in the outpatient setting.



On exam, vitals are stable.

CARDIOVASCULAR: S1, S2 muffled.

RESPIRATORY:  A few scattered rhonchi.

ABDOMEN:  Soft.

NERVOUS SYSTEM: No focal deficits.



DISCHARGE ADVICE:

1. Diet is cardiac.

2. Activity limited until followup.

3. Follow up with Dr. Guillen in 2 to 3 days.

4. Follow up with Dr. Thomas in 1 week.

5. Follow up with Dr. Martinez in 3 weeks.



MEDICATIONS:

1. Coreg 3.125 mg p.o. b.i.d.

2. Lipitor 40 mg p.o. daily.

3. Oxycodone 1 tab q.4 p.r.n.

4. Plavix 75 mg p.o. daily.

5. Aspirin 81 mg p.o. daily.

6. Bumex 1 mg p.o. b.i.d.

7. Levaquin 500 mg p.o. b.i.d. for 5 days.

8. Multivitamins 1 p.o. daily.

9. Zyloprim 100 mg p.o. daily.

FOLLOW-UP LABS: CBC, BMP in 2 to 3 days.  Continue to follow up.



Once again, the patient will be discharged in stable condition with guarded prognosis.



Please also note, the total time taken 35 minutes.





BARRERA / DIGNA: [id number] / Job#: [id number]